# Patient Record
Sex: FEMALE | Race: WHITE | NOT HISPANIC OR LATINO | Employment: OTHER | URBAN - METROPOLITAN AREA
[De-identification: names, ages, dates, MRNs, and addresses within clinical notes are randomized per-mention and may not be internally consistent; named-entity substitution may affect disease eponyms.]

---

## 2023-09-24 PROBLEM — Z79.899 ENCOUNTER FOR LONG-TERM CURRENT USE OF HIGH RISK MEDICATION: Status: ACTIVE | Noted: 2023-09-24

## 2023-09-24 PROBLEM — F41.1 GAD (GENERALIZED ANXIETY DISORDER): Status: ACTIVE | Noted: 2023-09-24

## 2023-09-24 PROBLEM — F31.10 BIPOLAR DISORDER, CURRENT EPISODE MANIC WITHOUT PSYCHOTIC FEATURES (MULTI): Status: ACTIVE | Noted: 2023-09-24

## 2023-09-24 PROBLEM — G47.9 SLEEP DISTURBANCES: Status: ACTIVE | Noted: 2023-09-24

## 2023-09-24 PROBLEM — F70 MILD INTELLECTUAL DISABILITY: Status: ACTIVE | Noted: 2023-09-24

## 2023-09-24 PROBLEM — F84.9: Status: ACTIVE | Noted: 2023-09-24

## 2023-09-24 RX ORDER — LOSARTAN POTASSIUM 50 MG/1
TABLET ORAL
COMMUNITY

## 2023-09-24 RX ORDER — SPIRONOLACTONE 25 MG/1
TABLET ORAL
COMMUNITY

## 2023-09-24 RX ORDER — EPINEPHRINE 0.3 MG/.3ML
INJECTION INTRAMUSCULAR
COMMUNITY

## 2023-09-24 RX ORDER — DROSPIRENONE AND ETHINYL ESTRADIOL 0.02-3(28)
KIT ORAL
COMMUNITY

## 2023-09-24 RX ORDER — ARIPIPRAZOLE 5 MG/1
TABLET ORAL
COMMUNITY

## 2023-09-24 RX ORDER — LITHIUM CARBONATE 150 MG/1
150 CAPSULE ORAL NIGHTLY
COMMUNITY

## 2023-09-24 RX ORDER — PROGESTERONE 100 MG/1
CAPSULE ORAL
COMMUNITY

## 2023-09-24 RX ORDER — LITHIUM CARBONATE 300 MG/1
TABLET, FILM COATED, EXTENDED RELEASE ORAL
COMMUNITY

## 2023-09-24 RX ORDER — ALPRAZOLAM 0.5 MG/1
0.5 TABLET ORAL DAILY PRN
COMMUNITY

## 2023-09-24 RX ORDER — TALC
3 POWDER (GRAM) TOPICAL NIGHTLY
COMMUNITY

## 2023-10-12 PROBLEM — F70 MILD INTELLECTUAL DISABILITY: Status: ACTIVE | Noted: 2023-10-12

## 2023-10-12 PROBLEM — F31.10 BIPOLAR DISORDER, CURRENT EPISODE MANIC WITHOUT PSYCHOTIC FEATURES (MULTI): Status: ACTIVE | Noted: 2023-10-12

## 2023-10-12 PROBLEM — F41.1 GAD (GENERALIZED ANXIETY DISORDER): Status: ACTIVE | Noted: 2023-10-12

## 2023-10-12 PROBLEM — F84.9: Status: ACTIVE | Noted: 2023-10-12

## 2023-10-12 PROBLEM — G47.9 SLEEP DISTURBANCES: Status: ACTIVE | Noted: 2023-10-12

## 2023-10-12 RX ORDER — LITHIUM CARBONATE 150 MG/1
1 CAPSULE ORAL NIGHTLY
COMMUNITY
Start: 2018-01-08 | End: 2023-10-24 | Stop reason: SDUPTHER

## 2023-10-12 RX ORDER — ALPRAZOLAM 0.5 MG/1
1 TABLET ORAL DAILY PRN
COMMUNITY
Start: 2017-10-13 | End: 2024-02-23 | Stop reason: SDUPTHER

## 2023-10-12 RX ORDER — LITHIUM CARBONATE 300 MG/1
2 TABLET, FILM COATED, EXTENDED RELEASE ORAL
COMMUNITY
Start: 2018-04-12 | End: 2023-10-24 | Stop reason: SDUPTHER

## 2023-10-12 RX ORDER — LOSARTAN POTASSIUM 50 MG/1
50 TABLET ORAL
COMMUNITY
Start: 2017-06-02

## 2023-10-12 RX ORDER — EPINEPHRINE 0.3 MG/.3ML
INJECTION INTRAMUSCULAR
COMMUNITY
Start: 2017-08-17

## 2023-10-12 RX ORDER — TALC
1 POWDER (GRAM) TOPICAL NIGHTLY
COMMUNITY
Start: 2019-01-14

## 2023-10-12 RX ORDER — PROGESTERONE 100 MG/1
100 CAPSULE ORAL
COMMUNITY
Start: 2020-10-09

## 2023-10-12 RX ORDER — SPIRONOLACTONE 25 MG/1
25 TABLET ORAL
COMMUNITY
Start: 2021-09-20

## 2023-10-12 RX ORDER — DROSPIRENONE AND ETHINYL ESTRADIOL 0.02-3(28)
KIT ORAL
COMMUNITY
Start: 2017-07-06

## 2023-10-13 ENCOUNTER — TELEMEDICINE (OUTPATIENT)
Dept: BEHAVIORAL HEALTH | Facility: CLINIC | Age: 38
End: 2023-10-13
Payer: MEDICARE

## 2023-10-13 DIAGNOSIS — F31.10 BIPOLAR DISORDER, CURRENT EPISODE MANIC WITHOUT PSYCHOTIC FEATURES (MULTI): ICD-10-CM

## 2023-10-13 DIAGNOSIS — F41.1 GAD (GENERALIZED ANXIETY DISORDER): ICD-10-CM

## 2023-10-13 DIAGNOSIS — F70 MILD INTELLECTUAL DISABILITY: ICD-10-CM

## 2023-10-13 PROCEDURE — 90834 PSYTX W PT 45 MINUTES: CPT | Performed by: COUNSELOR

## 2023-10-13 NOTE — PROGRESS NOTES
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 She will yell and scream that she is going to get sick, fall into the lake, etc; when we tried to challenge what was bothering her about [place], she notes it would be busy, when asked how she would know that, she said it was bc of COVID, when asked if she was worried about getting COVID, she said no  - she notes she is excited for the weekend and will see her sister and go to the [facility] and she is going out with her provider (they went to the zoo and her provider stayed overnight with just her at her house)  - mom notes she is really worried about the carpet installation they are about to have; they “taped off” the area and she ripped it all down    focused on:  - take a photo of her room before they break it down, have her help box things up and then unpack them when its done  - coping skills; distraction techniques  - we talked about the response should match the problem, small problem, small response  - her \"gripe list\" and talking out her emotions with her parents    "

## 2023-10-24 ENCOUNTER — TELEMEDICINE (OUTPATIENT)
Dept: BEHAVIORAL HEALTH | Facility: CLINIC | Age: 38
End: 2023-10-24
Payer: MEDICARE

## 2023-10-24 VITALS
SYSTOLIC BLOOD PRESSURE: 130 MMHG | HEART RATE: 83 BPM | BODY MASS INDEX: 37.03 KG/M2 | HEIGHT: 63 IN | DIASTOLIC BLOOD PRESSURE: 78 MMHG | WEIGHT: 209 LBS

## 2023-10-24 DIAGNOSIS — F31.10 BIPOLAR DISORDER, CURRENT EPISODE MANIC WITHOUT PSYCHOTIC FEATURES (MULTI): ICD-10-CM

## 2023-10-24 DIAGNOSIS — F70 MILD INTELLECTUAL DISABILITY: ICD-10-CM

## 2023-10-24 DIAGNOSIS — F84.0 AUTISM (HHS-HCC): ICD-10-CM

## 2023-10-24 DIAGNOSIS — G47.9 SLEEP DISTURBANCES: ICD-10-CM

## 2023-10-24 DIAGNOSIS — F41.1 GAD (GENERALIZED ANXIETY DISORDER): ICD-10-CM

## 2023-10-24 PROCEDURE — 99214 OFFICE O/P EST MOD 30 MIN: CPT | Performed by: NURSE PRACTITIONER

## 2023-10-24 RX ORDER — LITHIUM CARBONATE 300 MG/1
TABLET, FILM COATED, EXTENDED RELEASE ORAL
Qty: 360 TABLET | Refills: 1 | Status: SHIPPED | OUTPATIENT
Start: 2023-10-24 | End: 2024-04-18

## 2023-10-24 RX ORDER — LITHIUM CARBONATE 150 MG/1
150 CAPSULE ORAL NIGHTLY
Qty: 90 CAPSULE | Refills: 1 | Status: SHIPPED | OUTPATIENT
Start: 2023-10-24 | End: 2024-04-18

## 2023-10-24 RX ORDER — ARIPIPRAZOLE 5 MG/1
TABLET ORAL
Qty: 90 TABLET | Refills: 1 | Status: SHIPPED | OUTPATIENT
Start: 2023-10-24 | End: 2024-04-18

## 2023-10-24 NOTE — PROGRESS NOTES
ASSESSMENT: Ms. Pinto presents at best baseline mental health wise.   See treatment plan below.     Pharmacogenomics Testing (PGT) results reviewed:   Use as Directed:    Alprazolam (Xanax), Aripiprazole (Abilify)  No Proven Genetic Markers:   Lithium     PLAN:                                                                                                                       problems treated   f/u requested to prevent relapse   medications renewed/re-ordered     1. Continue alprazolam (Xanax) 0.50 mg by mouth daily PRN to be taken prior to leaving for any new event or activity. I have personally reviewed the OARRS report 10/24/23. I have considered the risks of abuse, dependence, addiction and diversion. Last filled 10/12/2022 for a quantity of 30 tablets.  No urine benzodiazepine confirmation screen given due to client takes this so rarely that urine drug screen will be negative.  2. Continue Lithium 600 mg by mouth in the morning, 300 mg at 3 pm, 450 mg at bedtime for moods  3. Continue Aripiprazole (Abilify) take a half a tab (2.5 mg) by mouth daily, may take additional half TAB (2.5 mg) by mouth daily PRN for moods. Mom will contact when refill is needed.  4. Risks, benefits, alternatives, off-label uses, and side effects of medications have been discussed with patient/caregiver. There is no report of signs/symptoms consistent with medication-induced impairment in daily functioning. At this time, benefits of medication felt to outweigh potential risks. Will continue to reassess need for psychotropic medication at regular 3 to 6 month intervals.  5. Return To Clinic in 6 months or earlier if needed. Call [phone] to reschedule.     Thank you for seeing me today.  If you have any questions or concerns, do not hesitate to contact my office.  Millie Ugarte     TREATMENT TYPE                                                                                                  counseling and coordination of  care addressing signs and symptoms of illness; risks/benefits and side effects of medications; and behavioral approaches to illness.  This note was created using electronic dictation. There may be errors in syntax and meaning. Please contact the office with any questions.   For [facility] residents, [employer] is a 24/7 hotline you can call for assistance [[phone]].   Please call 911 or go to your closest Emergency Room if you feel worse. This includes thoughts of hurting yourself or anyone else, or having other troubles such as hearing voices, seeing visions, or having new and scary thoughts about the people around you.      Provider Impressions     PRESENT FOR APPOINTMENT  Client  Mother Bessy Pinto  Father: Mohamud Pinto     SUBJECTIVE: 39 yo CF with a history of Autism, MATTHEW, ID and Bipolar DO presenting for medication management.      Last seen May 2023.  At that time, no medication changes.  November 2022. At that time, no medication changes.  October 2022. At that time, alprazolam PRN was increased.   April 2022. At that time, no medication changes.  January 2022. At that time, no medication changes.  October 2021. At that time, no medication changes.   July 2021. At that time, alprazolam PRN was decreased.   April 2021.At that time, no medication changes.   January 2021.At that time, no medication changes.   October 2020. At that time, no medication changes.   July 2020. At that time, Abilify was decreased and PRN Alprazolam was increased.  November 2019. At that time, no med changes.  August 2019. At that time, no med changes.  April 2019. At that time, no med changes.  January 2019. At that time, Melatonin was started.   August 2018. At that time, no med changes.  July 2018. At that time, recommended moving majority of dose Tripp to HS.   April 2018. At that time, no medicine changes.  February 2018: Alprazolam was discontinued due to complaints of dizziness  Jan 2018: BuSpar was  "discontinued and lithium was increased.  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 At that time, BuSpar was started.     Ava reports that she went to [place] & watched the buffalo run.  Lists activities: watches [employer], walks, dinner w/friends, talks on phone, raking leaves, going to dress as a witch for [employer].   Sleeping good at night.  Sees monthly [facility] for therapy.  October 2022 DNC. discovered rectal prolapse. Saw colorectal sx in [facility] & colorectal hysterectomy- decided no Sx but IUD. No longer bleeding and mood has improved.  May have need sx for prolapsed rectum.  Sees Candis, through provider services, every Monday. Goes for walks.       PRN alprazolam and PRN Abilify:  Vacation at [facility]- needed PRN most every day. Did not want to shower- screaming and parents afraid others will call police.  Vielka SCHULTZ, in parking garage- refused to get out to eat at restaurant- screaming. Left without eating.  Still needs PRN when making plans with friends.  \"her anxiety is holding us hostage. we have had to change plans.\"  small ramps cause screaming.  Even when plans are made in advance, they will drive there ([place]) once there, she will start screaming.  \"Panic in her eyes\".   \"Off the chart\" panic - vacation= 3 x in a week.   - any time in a new environment. Parents go ahead of time and will video the route to walk in and bring it back to her. she mostly refuses to walk to the event.  - mostly stays in her room, cries, stays in her room, states she is sick. She may change her mind and then go.      She may cry, but uses deep breathing and able to calm herself. Plans are not made too far in advanced. Ava will obsess over it until the time comes. When her parents are leaving, she often holds then up by changing her mind about whether she is going or not.     eats dinner in her own apartment- which is in her parents home  grocery [employer]     Has a 6 yo Border Collie named Juan.  Sleep: Denies issues " falling and staying asleep. States that the Melatonin is working.      Utilizing charts and check off list for rules and behavior. Runs the vacuum, sets the table, laundry     HX: screaming, Somatic: With complaints of headache and stomachache, slamming the doors, going to her room and refusing to come out, reported aggression (hitting/pushing?) lasting 30 min, until parents must leave in order to get to work on time.      Ava identifies more with elderly than with peers.   MEDICATIONS: Ritalin- blinking tics developed.  Anafranil = made worse, Pamelor, then Lindon since age 7. Dx manic/depressive.   Senior year of HS- Imipramine- caused HS bed wetting= then Abilify  BuSpar= increased anxiety     Psy/SI/HI/aggression. Denies A/V/TH. Ava states that she is only sad when a friend dies.  Dx PDD and Bipolar age 7 yo   Med Physicians:  PCP: Dr. Webb/ [facility]. July 2018.   Dr. Webb office # [phone] = for lab & EKG results  Urologist: Feb 2018. May need cystogram. If so, will be performed in OR.  GYN:Dr. Culp. September 30, 2022 DNC- possible hysterectomy. F/U 11/1/22  ALLERGIES: NKDA     Med SE wt estella since middle school  2003/2004 hyperthyroid lost 30#  2007 gall bladder removed     COMPLIANCE: good     EPS- lower leg shuffling (not noted as via zoom) and pill rolling (1) oral buccal movements with tongue jutting (1) mouth opening (1) noted. Ct states does not feel uncomfortable in her skin. Severity (1). Ct does not appear aware. With decrease in Abilify, she has had a decrease in hand tremor.  AIMS score 4 based on the above. 5/9/23     LABS REVIEWED:  August 2022 in chart  May 2021: in chart under May 7 2021 [facility] Patient Information  Lithium (0.8)  August 2020: Lithium (0.8), lipid, CBC, CMP- reviewed.  July 2019: Lipid, BMP, & CBC reviewed.  Feb 2019: Lithium 0.9  others reviewed     Feb 2018:  TSH- WNL  Lithium-1.0- WNL  Vit D-40.6- WNL     EKG   May 2021: in chart under May 7  2021 [facility] Patient Information  66 bpm  QTc 396     August 2019:  65 bpm  QTc 416 ms   Mental Status Exam     Appearance: well-groomed.   Build: overweight.   Demeanor: average.   Eye Contact: average.   Motor Activity: average.   Speech: clear.   Language: Neurologic language is intact.   Fund of Knowledge: fair fund of knowledge.   Delusions: None Reported.   Self Harm: None Reported.   Aggressive: None Reported.   Mood: euthymic.   Affect: full.   Orientation: alert.   Manner: cooperative.   Thought process: concrete.   Thought association: Impairment in rational thinking.   Content of thought: As noted in HPI   Abstract/ Rational Thought: minimal impairment   Memory: grossly intact.   Behavior: calm.   Intelligence Estimate: intellectual disability.   Insight: minimal impairment.   Judgement: minimal impairment.   Musculoskeletal: normal strength and tone.      Scores and Scales        Facial and Oral Movements:   Muscles of Facial Expression eg. movements of forehead, eyebrows, periorbital area, cheeks; include frowning, blinking, smiling, grimacing: None normal (0).   Lips and Perioral Area eg. puckering, pouting, smacking: Minimal (may be extreme normal) (1).   Jaw eg. biting, clenching, chewing, mouth opening, lateral movement: None normal (0).   Tongue. Rate only increases in movement both in and out of mouth, NOT inability to sustain movement Minimal (may be extreme normal) (1).   Extremity Movements:   Upper (arms, wrists, hands, fingers). Include chronic movements ( ie, rapid, objectively purposeless, irregular, spontaneous); athetoid movements (ie, slow, irregular, complex, serpentine). DO NOT include tremor (ie, repetitive, regular, rythmic): Minimal (may be extreme normal) (1).   Lower (legs, knees, ankles, toes) eg, lateral knee movement, foot tapping, heel dropping, foot squirming, inversion and eversion of foot: None normal (0).   Trunk Movements:   Neck, shoulders, hips. eg, rocking, twisting,  squirming, pelvic gyrations: None normal (0).      Overall Severity:   Severity of abnormal movements: Minimal (may be extreme normal) (1).   Incapacitation due to abnormal movements: None normal (0).   Patient's awareness of abnormal movements (rate only patient's report): No awareness (0).   Dental Status:   Current porblems with teeth and/or dentures: No.   Does patient usually wear dentures: No.

## 2023-11-17 ENCOUNTER — TELEMEDICINE (OUTPATIENT)
Dept: BEHAVIORAL HEALTH | Facility: CLINIC | Age: 38
End: 2023-11-17
Payer: MEDICARE

## 2023-11-17 DIAGNOSIS — F41.1 GAD (GENERALIZED ANXIETY DISORDER): ICD-10-CM

## 2023-11-17 DIAGNOSIS — F31.10 BIPOLAR DISORDER, CURRENT EPISODE MANIC WITHOUT PSYCHOTIC FEATURES (MULTI): ICD-10-CM

## 2023-11-17 DIAGNOSIS — F70 MILD INTELLECTUAL DISABILITY: ICD-10-CM

## 2023-11-17 PROCEDURE — 90832 PSYTX W PT 30 MINUTES: CPT | Performed by: COUNSELOR

## 2023-11-17 NOTE — PROGRESS NOTES
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)  Visit Type: via epic  Reason for visit: managing her worry and mood    - notes she went to her friends bday party (she turned 79), she notes went to the dentist and did good  - notes she went trick or treating and had fun  - she notes she had dinner with her sister and “stayed calm” and they had the carpet replaced and she did good, she helped move things  - she notes she has been using her walking stick and really likes it  - kept noting she has been good and staying calm, looking forward to [place], making her list, seeing family, eating, seeing Alyssa with her provider  - parents note overall its been really good, but they never assume it will stay that way; they did note she recently got a medical diagnosis that can be made worse by anxiety and hearing that from the doc did make an impact    focused on:  - activity books to stay calm  - coping skills; distraction techniques  - we talked about the response should match the problem, small problem, small response  - her \"gripe list\" and talking out her emotions with her parents      "

## 2023-12-15 ENCOUNTER — TELEMEDICINE (OUTPATIENT)
Dept: BEHAVIORAL HEALTH | Facility: CLINIC | Age: 38
End: 2023-12-15
Payer: MEDICARE

## 2023-12-15 DIAGNOSIS — F70 MILD INTELLECTUAL DISABILITY: ICD-10-CM

## 2023-12-15 DIAGNOSIS — F41.1 GAD (GENERALIZED ANXIETY DISORDER): ICD-10-CM

## 2023-12-15 DIAGNOSIS — F31.10 BIPOLAR DISORDER, CURRENT EPISODE MANIC WITHOUT PSYCHOTIC FEATURES (MULTI): ICD-10-CM

## 2023-12-15 PROCEDURE — 90834 PSYTX W PT 45 MINUTES: CPT | Performed by: COUNSELOR

## 2023-12-15 NOTE — PROGRESS NOTES
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 She notes she will just “not worry next time”  - she notes she has been doing really well using her walking stick and getting outside [place] bc the weather has been better, no snow    focused on:  - coping skills; distraction techniques and using her activity/sticker books, walking stick  - reviewed response should match the problem, small problem, small response  - her \"gripe list\" and talking out her emotions with her parents ([place] about things like her hair)    "

## 2024-01-19 ENCOUNTER — TELEMEDICINE (OUTPATIENT)
Dept: BEHAVIORAL HEALTH | Facility: CLINIC | Age: 39
End: 2024-01-19
Payer: MEDICARE

## 2024-01-19 DIAGNOSIS — F31.10 BIPOLAR DISORDER, CURRENT EPISODE MANIC WITHOUT PSYCHOTIC FEATURES (MULTI): ICD-10-CM

## 2024-01-19 DIAGNOSIS — F41.1 GAD (GENERALIZED ANXIETY DISORDER): ICD-10-CM

## 2024-01-19 DIAGNOSIS — F70 MILD INTELLECTUAL DISABILITY: ICD-10-CM

## 2024-01-19 PROCEDURE — 90834 PSYTX W PT 45 MINUTES: CPT | Performed by: COUNSELOR

## 2024-01-19 NOTE — PROGRESS NOTES
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)  Visit Type: epic  Reason for visit: managing her mood and worry    - she notes she went to the movies and it was too much and they left; she notes it was too crowded  - she notes she went to a foot doc apt and started to have a hard time and had to take a Xanax; when asked why she thinks shes having a hard time she said she is not sure, she said she wants to see people and talk, but she doesn't like “going out anymore”  - notes she has been doing more puzzles and got a light brite for [place]ae   - when asked what she has been doing to cope she stated deep breaths, it was noted again that deep breaths don't seem to be working, she noted she talked about it and told mom she didn't want to go to the movies but they went anyway. We spent time trying to figure out what and how she was feeling when at the movies, she mentioned there were parts of the movie that were scary and she felt “jumpy”    focused on:  - make plan before going somewhere, try to work through it while there, bring something with her, like a fidget or stress ball, activity book  - coping skills; distraction techniques, walking stick  - reviewed response should match the problem, small problem, small response and continue with her \"gripe list\" and talking out her emotions with her parents      "

## 2024-02-23 ENCOUNTER — OFFICE VISIT (OUTPATIENT)
Dept: BEHAVIORAL HEALTH | Facility: CLINIC | Age: 39
End: 2024-02-23
Payer: COMMERCIAL

## 2024-02-23 VITALS
HEART RATE: 88 BPM | WEIGHT: 218 LBS | DIASTOLIC BLOOD PRESSURE: 76 MMHG | BODY MASS INDEX: 38.62 KG/M2 | TEMPERATURE: 98 F | SYSTOLIC BLOOD PRESSURE: 125 MMHG | RESPIRATION RATE: 18 BRPM

## 2024-02-23 DIAGNOSIS — Z79.899 ENCOUNTER FOR LONG-TERM (CURRENT) USE OF HIGH-RISK MEDICATION: ICD-10-CM

## 2024-02-23 DIAGNOSIS — F41.1 GAD (GENERALIZED ANXIETY DISORDER): ICD-10-CM

## 2024-02-23 PROCEDURE — 1036F TOBACCO NON-USER: CPT | Performed by: NURSE PRACTITIONER

## 2024-02-23 PROCEDURE — 99215 OFFICE O/P EST HI 40 MIN: CPT | Performed by: NURSE PRACTITIONER

## 2024-02-23 RX ORDER — ALPRAZOLAM 0.5 MG/1
0.5 TABLET ORAL 2 TIMES DAILY PRN
Qty: 60 TABLET | Refills: 2 | Status: SHIPPED | OUTPATIENT
Start: 2024-02-23 | End: 2024-04-24 | Stop reason: SDUPTHER

## 2024-02-23 ASSESSMENT — PAIN SCALES - GENERAL: PAINLEVEL: 0-NO PAIN

## 2024-02-23 NOTE — PROGRESS NOTES
ASSESSMENT: Ms. Pinto presents with increased anxiety, anticipatory anxiety, panic and agoraphobia.    See treatment plan below.     Pharmacogenomics Testing (PGT) results reviewed:   Use as Directed:    Alprazolam (Xanax), Aripiprazole (Abilify)  No Proven Genetic Markers:   Lithium     PLAN:                                                                                                                       problems treated   f/u requested to prevent relapse   medications renewed/re-ordered     1. Increase alprazolam (Xanax) 0.50 mg by mouth BID. I have personally reviewed the OARRS report 2/23/24. I have considered the risks of abuse, dependence, addiction and diversion. No urine benzodiazepine confirmation screen given due to client takes this so rarely that urine drug screen will be negative.  Benzo agreement signed 2/23/24  2. Continue Lithium 600 mg by mouth in the morning, 300 mg at 3 pm, 450 mg at bedtime for moods  3. Continue Aripiprazole (Abilify) take a half a tab (2.5 mg) by mouth daily, may take additional half TAB (2.5 mg) by mouth daily PRN for moods. Mom will contact when refill is needed.  4. Risks, benefits, alternatives, off-label uses, and side effects of medications have been discussed with patient/caregiver. There is no report of signs/symptoms consistent with medication-induced impairment in daily functioning. At this time, benefits of medication felt to outweigh potential risks. Will continue to reassess need for psychotropic medication at regular 3 to 6 month intervals.  5. Return To Clinic in 1-3 months or earlier if needed. Call [phone] to reschedule.  6. Rx for fasting labs and lithium level given      Thank you for seeing me today.  If you have any questions or concerns, do not hesitate to contact my office.  Millie Ugarte     TREATMENT TYPE                                                                                                  counseling and coordination of care  addressing signs and symptoms of illness; risks/benefits and side effects of medications; and behavioral approaches to illness.  This note was created using electronic dictation. There may be errors in syntax and meaning. Please contact the office with any questions.   For [facility] residents, [employer] is a 24/7 hotline you can call for assistance [[phone]].   Please call 911 or go to your closest Emergency Room if you feel worse. This includes thoughts of hurting yourself or anyone else, or having other troubles such as hearing voices, seeing visions, or having new and scary thoughts about the people around you.      Provider Impressions     PRESENT FOR APPOINTMENT  Client  Mother Bessy Pinto  Father: Mohamud Pinto  face-to-face appointment  seen for an earlier appointment due to decompensation.  SUBJECTIVE: 37 yo CF with a history of Autism, MATTHEW, ID and Bipolar DO presenting for medication management.      Last seen   October 2023. At that time, no medication changes.  May 2023.  At that time, no medication changes.  November 2022. At that time, no medication changes.  October 2022. At that time, alprazolam PRN was increased.   April 2022. At that time, no medication changes.  January 2022. At that time, no medication changes.  October 2021. At that time, no medication changes.   July 2021. At that time, alprazolam PRN was decreased.   April 2021.At that time, no medication changes.   January 2021.At that time, no medication changes.   October 2020. At that time, no medication changes.   July 2020. At that time, Abilify was decreased and PRN Alprazolam was increased.  November 2019. At that time, no med changes.  August 2019. At that time, no med changes.  April 2019. At that time, no med changes.  January 2019. At that time, Melatonin was started.   August 2018. At that time, no med changes.  July 2018. At that time, recommended moving majority of dose [place] to HS.   April 2018. At that  time, no medicine changes.  February 2018: Alprazolam was discontinued due to complaints of dizziness  Jan 2018: BuSpar was discontinued and lithium was increased.  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 At that time, BuSpar was started.     From: [email or web address] <[email or web address]>   Sent: Thursday, February 22, 2024 9:38 AM  To: Millie Costa <[email or web address]>  Cc: Ale Ashby <[email or web address]>  Subject: Ava Pinto [birth year]  Hi Millie,  We are in need of immediate assistance.    Despite our monthly sessions with Ale and the use of Xanax before outings, Ava's anxiety episodes have escalated.    Her screaming, arm waving tantrums are to the point of us fearing that she will have a stroke.     She has started to get upset with us if we make plans and doesn't want to go with us. During these episodes she has tried to physically prevent us from leaving.  We have had to cancel plans because of this and she  is causing our dog to become agitated and act negatively toward her.    We have a zoom meeting scheduled for April 24 @ 1:15. We would like to schedule a meeting ASAP to discuss the best course of action.  Please let us know if this possible.    Thank you,     Juan Pinto  [phone] home  [phone] cell    Ava reports that she went to [facility] & watched the buffalo run.  Lists activities: watches [employer], walks, dinner w/friends, talks on phone, raking leaves, going to dress as a witch for [employer].   Sleeping good at night.  Sees monthly Ale for therapy.  October 2022 DNC. discovered rectal prolapse. Saw colorectal sx in [facility] & colorectal hysterectomy- decided no Sx but IUD. No longer bleeding and mood has improved.  May have need sx for prolapsed rectum.  Sees Candis, through provider services, every Monday. Goes for walks.       PRN alprazolam and PRN Abilify:  Vacation at [facility]- needed PRN most every day. Did not  "want to shower- screaming and parents afraid others will call police.  Vielka SCHULTZ, in parking garage- refused to get out to eat at restaurant- screaming. Left without eating.  Still needs PRN when making plans with friends.  \"her anxiety is holding us hostage. we have had to change plans.\"  small ramps cause screaming.  Even when plans are made in advance, they will drive there ([place]) once there, she will start screaming.  \"Panic in her eyes\".   \"Off the chart\" panic - vacation= 3 x in a week.   - any time in a new environment. Parents go ahead of time and will video the route to walk in and bring it back to her. she mostly refuses to walk to the event.  - mostly stays in her room, cries, stays in her room, states she is sick. She may change her mind and then go.      She may cry, but uses deep breathing and able to calm herself. Plans are not made too far in advanced. Ava will obsess over it until the time comes. When her parents are leaving, she often holds then up by changing her mind about whether she is going or not.     eats dinner in her own apartment- which is in her parents home  grocery shops     Has a 8 yo Border Collie named Juan.  Sleep: Denies issues falling and staying asleep. States that the Melatonin is working.      Utilizing charts and check off list for rules and behavior. Runs the vacuum, sets the table, laundry     HX: screaming, Somatic: With complaints of headache and stomachache, slamming the doors, going to her room and refusing to come out, reported aggression (hitting/pushing?) lasting 30 min, until parents must leave in order to get to work on time.      Ava identifies more with elderly than with peers.   MEDICATIONS: Ritalin- blinking tics developed.  Anafranil = made worse, Pamelor, then [place] since age 7. Dx manic/depressive.   Senior year of HS- Imipramine- caused HS bed wetting= then Abilify  BuSpar= increased anxiety     Psy/SI/HI/aggression. Denies A/V/TH. Ava " states that she is only sad when a friend dies.  Dx PDD and Bipolar age 7 yo   Med Physicians:  PCP: Dr. Webb/ [facility]. July 2018.   Dr. Webb office # [phone] = for lab & EKG results  Urologist: Feb 2018. May need cystogram. If so, will be performed in OR.  GYN:Dr. Culp. September 30, 2022 DNC- possible hysterectomy. F/U 11/1/22  ALLERGIES: NKDA     Med SE wt estella since middle school  2003/2004 hyperthyroid lost 30#  2007 gall bladder removed     COMPLIANCE: good     EPS- lower leg shuffling (not noted as via zoom) and pill rolling (1) oral buccal movements with tongue jutting (1) mouth opening (1) noted. Ct states does not feel uncomfortable in her skin. Severity (1). Ct does not appear aware. With decrease in Abilify, she has had a decrease in hand tremor.  AIMS score 4 based on the above. 5/9/23     LABS REVIEWED:  August 2022 in chart  May 2021: in chart under May 7 2021 [facility]Psych Patient Information  Lithium (0.8)  August 2020: Lithium (0.8), lipid, CBC, CMP- reviewed.  July 2019: Lipid, BMP, & CBC reviewed.  Feb 2019: Lithium 0.9  others reviewed     Feb 2018:  TSH- WNL  Lithium-1.0- WNL  Vit D-40.6- WNL     EKG   May 2021: in chart under May 7 2021 [facility] Patient Information  66 bpm  QTc 396     August 2019:  65 bpm  QTc 416 ms   Mental Status Exam     Appearance: well-groomed.   Build: overweight.   Demeanor: average.   Eye Contact: average.   Motor Activity: average.   Speech: clear.   Language: Neurologic language is intact.   Fund of Knowledge: fair fund of knowledge.   Delusions: None Reported.   Self Harm: None Reported.   Aggressive: None Reported.   Mood: euthymic.   Affect: full.   Orientation: alert.   Manner: cooperative.   Thought process: concrete.   Thought association: Impairment in rational thinking.   Content of thought: As noted in HPI   Abstract/ Rational Thought: minimal impairment   Memory: grossly intact.   Behavior: calm.   Intelligence Estimate: intellectual  disability.   Insight: minimal impairment.   Judgement: minimal impairment.   Musculoskeletal: normal strength and tone.      Scores and Scales        Facial and Oral Movements:   Muscles of Facial Expression eg. movements of forehead, eyebrows, periorbital area, cheeks; include frowning, blinking, smiling, grimacing: None normal (0).   Lips and Perioral Area eg. puckering, pouting, smacking: Minimal (may be extreme normal) (1).   Jaw eg. biting, clenching, chewing, mouth opening, lateral movement: None normal (0).   Tongue. Rate only increases in movement both in and out of mouth, NOT inability to sustain movement Minimal (may be extreme normal) (1).   Extremity Movements:   Upper (arms, wrists, hands, fingers). Include chronic movements ( ie, rapid, objectively purposeless, irregular, spontaneous); athetoid movements (ie, slow, irregular, complex, serpentine). DO NOT include tremor (ie, repetitive, regular, rythmic): Minimal (may be extreme normal) (1).   Lower (legs, knees, ankles, toes) eg, lateral knee movement, foot tapping, heel dropping, foot squirming, inversion and eversion of foot: None normal (0).   Trunk Movements:   Neck, shoulders, hips. eg, rocking, twisting, squirming, pelvic gyrations: None normal (0).      Overall Severity:   Severity of abnormal movements: Minimal (may be extreme normal) (1).   Incapacitation due to abnormal movements: None normal (0).   Patient's awareness of abnormal movements (rate only patient's report): No awareness (0).   Dental Status:   Current porblems with teeth and/or dentures: No.   Does patient usually wear dentures: No.

## 2024-02-23 NOTE — PATIENT INSTRUCTIONS
ASSESSMENT: Ms. Pinto presents with increased anxiety, anticipatory anxiety, panic and agoraphobia.    See treatment plan below.     Pharmacogenomics Testing (PGT) results reviewed:   Use as Directed:    Alprazolam (Xanax), Aripiprazole (Abilify)  No Proven Genetic Markers:   Lithium     PLAN:                                                                                                                       problems treated   f/u requested to prevent relapse   medications renewed/re-ordered     1. Increase alprazolam (Xanax) 0.50 mg by mouth BID. I have personally reviewed the OARRS report 2/23/24. I have considered the risks of abuse, dependence, addiction and diversion. No urine benzodiazepine confirmation screen given due to client takes this so rarely that urine drug screen will be negative.  2. Continue Lithium 600 mg by mouth in the morning, 300 mg at 3 pm, 450 mg at bedtime for moods  3. Continue Aripiprazole (Abilify) take a half a tab (2.5 mg) by mouth daily, may take additional half TAB (2.5 mg) by mouth daily PRN for moods. Mom will contact when refill is needed.  4. Risks, benefits, alternatives, off-label uses, and side effects of medications have been discussed with patient/caregiver. There is no report of signs/symptoms consistent with medication-induced impairment in daily functioning. At this time, benefits of medication felt to outweigh potential risks. Will continue to reassess need for psychotropic medication at regular 3 to 6 month intervals.  5. Return To Clinic in 1-3 months or earlier if needed. Call [phone] to reschedule.  6. Rx for fasting labs and lithium level given      Thank you for seeing me today.  If you have any questions or concerns, do not hesitate to contact my office.  Millie Ugarte     TREATMENT TYPE                                                                                                  counseling and coordination of care addressing signs and symptoms of  illness; risks/benefits and side effects of medications; and behavioral approaches to illness.  This note was created using electronic dictation. There may be errors in syntax and meaning. Please contact the office with any questions.   For [facility] residents, [employer] is a 24/7 hotline you can call for assistance [[phone]].   Please call 911 or go to your closest Emergency Room if you feel worse. This includes thoughts of hurting yourself or anyone else, or having other troubles such as hearing voices, seeing visions, or having new and scary thoughts about the people around you.

## 2024-02-26 ENCOUNTER — LAB (OUTPATIENT)
Dept: LAB | Facility: LAB | Age: 39
End: 2024-02-26
Payer: MEDICARE

## 2024-02-26 DIAGNOSIS — Z79.899 ENCOUNTER FOR LONG-TERM (CURRENT) USE OF HIGH-RISK MEDICATION: ICD-10-CM

## 2024-02-26 LAB
ALBUMIN SERPL BCP-MCNC: 4.5 G/DL (ref 3.4–5)
ALP SERPL-CCNC: 74 U/L (ref 33–110)
ALT SERPL W P-5'-P-CCNC: 21 U/L (ref 7–45)
ANION GAP SERPL CALC-SCNC: 11 MMOL/L (ref 10–20)
AST SERPL W P-5'-P-CCNC: 12 U/L (ref 9–39)
BASOPHILS # BLD AUTO: 0.05 X10*3/UL (ref 0–0.1)
BASOPHILS NFR BLD AUTO: 0.5 %
BILIRUB SERPL-MCNC: 0.6 MG/DL (ref 0–1.2)
BUN SERPL-MCNC: 17 MG/DL (ref 6–23)
CALCIUM SERPL-MCNC: 10.6 MG/DL (ref 8.6–10.6)
CHLORIDE SERPL-SCNC: 110 MMOL/L (ref 98–107)
CHOLEST SERPL-MCNC: 158 MG/DL (ref 0–199)
CHOLESTEROL/HDL RATIO: 4.3
CO2 SERPL-SCNC: 29 MMOL/L (ref 21–32)
CREAT SERPL-MCNC: 1.12 MG/DL (ref 0.5–1.05)
EGFRCR SERPLBLD CKD-EPI 2021: 64 ML/MIN/1.73M*2
EOSINOPHIL # BLD AUTO: 0.31 X10*3/UL (ref 0–0.7)
EOSINOPHIL NFR BLD AUTO: 3.1 %
ERYTHROCYTE [DISTWIDTH] IN BLOOD BY AUTOMATED COUNT: 12.9 % (ref 11.5–14.5)
GLUCOSE SERPL-MCNC: 94 MG/DL (ref 74–99)
HCT VFR BLD AUTO: 48.2 % (ref 36–46)
HDLC SERPL-MCNC: 36.6 MG/DL
HGB BLD-MCNC: 14.8 G/DL (ref 12–16)
IMM GRANULOCYTES # BLD AUTO: 0.04 X10*3/UL (ref 0–0.7)
IMM GRANULOCYTES NFR BLD AUTO: 0.4 % (ref 0–0.9)
LDLC SERPL CALC-MCNC: 68 MG/DL
LITHIUM SERPL-SCNC: 0.9 MMOL/L (ref 0.6–1.2)
LYMPHOCYTES # BLD AUTO: 2.99 X10*3/UL (ref 1.2–4.8)
LYMPHOCYTES NFR BLD AUTO: 30.1 %
MCH RBC QN AUTO: 27.8 PG (ref 26–34)
MCHC RBC AUTO-ENTMCNC: 30.7 G/DL (ref 32–36)
MCV RBC AUTO: 90 FL (ref 80–100)
MONOCYTES # BLD AUTO: 0.43 X10*3/UL (ref 0.1–1)
MONOCYTES NFR BLD AUTO: 4.3 %
NEUTROPHILS # BLD AUTO: 6.11 X10*3/UL (ref 1.2–7.7)
NEUTROPHILS NFR BLD AUTO: 61.6 %
NON HDL CHOLESTEROL: 121 MG/DL (ref 0–149)
NRBC BLD-RTO: 0 /100 WBCS (ref 0–0)
PLATELET # BLD AUTO: 305 X10*3/UL (ref 150–450)
POTASSIUM SERPL-SCNC: 4.3 MMOL/L (ref 3.5–5.3)
PROT SERPL-MCNC: 7.3 G/DL (ref 6.4–8.2)
RBC # BLD AUTO: 5.33 X10*6/UL (ref 4–5.2)
SODIUM SERPL-SCNC: 146 MMOL/L (ref 136–145)
TRIGL SERPL-MCNC: 268 MG/DL (ref 0–149)
VLDL: 54 MG/DL (ref 0–40)
WBC # BLD AUTO: 9.9 X10*3/UL (ref 4.4–11.3)

## 2024-02-26 PROCEDURE — 80178 ASSAY OF LITHIUM: CPT

## 2024-02-26 PROCEDURE — 36415 COLL VENOUS BLD VENIPUNCTURE: CPT

## 2024-02-26 PROCEDURE — 80061 LIPID PANEL: CPT

## 2024-02-26 PROCEDURE — 80053 COMPREHEN METABOLIC PANEL: CPT

## 2024-02-26 PROCEDURE — 85025 COMPLETE CBC W/AUTO DIFF WBC: CPT

## 2024-02-27 ENCOUNTER — TELEPHONE (OUTPATIENT)
Dept: BEHAVIORAL HEALTH | Facility: CLINIC | Age: 39
End: 2024-02-27
Payer: MEDICARE

## 2024-02-27 DIAGNOSIS — F31.10 BIPOLAR DISORDER, CURRENT EPISODE MANIC WITHOUT PSYCHOTIC FEATURES (MULTI): ICD-10-CM

## 2024-02-27 NOTE — PROGRESS NOTES
Called to update on lab results and to check to see how Ava is doing.  [phone] (M) Mohamud & Bessy    More relaxed, calmer  Labs threw her off since out of routine, better in evening.  Still hypertalkative.  Have seen improvements with alprazolam.  Mom reports a little happier and less stressed.    New order:  Increase Abilify 5 mg (from 2.5 mg) by mouth daily  Mom will administer 2nd half today and start whole tab QAM tomorrow 2/28/24.  RTC at scheduled April apt.

## 2024-02-28 ENCOUNTER — TELEMEDICINE (OUTPATIENT)
Dept: BEHAVIORAL HEALTH | Facility: CLINIC | Age: 39
End: 2024-02-28
Payer: COMMERCIAL

## 2024-02-28 DIAGNOSIS — F31.10 BIPOLAR DISORDER, CURRENT EPISODE MANIC WITHOUT PSYCHOTIC FEATURES (MULTI): ICD-10-CM

## 2024-02-28 DIAGNOSIS — F41.1 GAD (GENERALIZED ANXIETY DISORDER): ICD-10-CM

## 2024-02-28 DIAGNOSIS — F70 MILD INTELLECTUAL DISABILITY: ICD-10-CM

## 2024-02-28 PROCEDURE — 90837 PSYTX W PT 60 MINUTES: CPT | Performed by: COUNSELOR

## 2024-02-28 PROCEDURE — 1036F TOBACCO NON-USER: CPT | Performed by: COUNSELOR

## 2024-02-28 NOTE — PROGRESS NOTES
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)  Visit Type: epic (5 min) rest via phone (could not connect via [employer])  Reason for visit: managing her worry     - when asked how she is, she states good (but mom emailed she has been having huge meltdowns, to the point they worry she is going to “stroke out”)  - chain of events: she was upset/anxious about her parents going to [place]- being alone- began running around- upset the dog- dog bit her- now on antibiotics and saw her NP for a change in meds  - when asked how we will manage her worry, as usual, she stated she will just stay calm and she was pushed/challenged that she needs to stop and think about what she can really do, things she is comfortable with (we talked about getting the “inside thoughts outside”) and asking her parents for help, using her white board to say she is worried or upset  - talking about what is happening in her body in her head when she starts to feel that; challenging irrational thoughts  - overall talked about her worries, mood and getting out into the community her provider, upcoming holidays      focused on:  - make plan before going somewhere, try to work through it while there, bring something with her, like a fidget or stress ball, activity book  - coping skills; distraction techniques, walking stick  - reviewed response should match the problem, small problem, small response

## 2024-03-20 ENCOUNTER — TELEMEDICINE CLINICAL SUPPORT (OUTPATIENT)
Dept: BEHAVIORAL HEALTH | Facility: CLINIC | Age: 39
End: 2024-03-20
Payer: MEDICARE

## 2024-03-20 DIAGNOSIS — F70 MILD INTELLECTUAL DISABILITY: ICD-10-CM

## 2024-03-20 DIAGNOSIS — F31.10 BIPOLAR DISORDER, CURRENT EPISODE MANIC WITHOUT PSYCHOTIC FEATURES (MULTI): ICD-10-CM

## 2024-03-20 DIAGNOSIS — F41.1 GAD (GENERALIZED ANXIETY DISORDER): ICD-10-CM

## 2024-03-20 PROCEDURE — 90837 PSYTX W PT 60 MINUTES: CPT | Performed by: COUNSELOR

## 2024-03-20 NOTE — PROGRESS NOTES
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 Parents not things overall were better, went and got her hair cut, excited, then it began to snow and right away she stated she is sick and cried, its these small things that she panics about really not any big issues, running errands. She then joined and noted she went to the mall to see the [place] [employer] but he wasn't there  - when asked what happened she kept saying I don't know, and would avoid the question and began talking over the clinician and would ask questions  - she notes she is taking deep breaths and will calm down and she is happy going out with her friends; she was asked about her hair cut, she initially said I don't know, then she said it was due to the snow, dad shoveled and she went in without issues  - notes she is seeing the mayors wife and is excited for the zoo and the eclipse (we tried to spend time prepping to not worry if the weather is bad; she really just wanted to say stay calm, we talked about being proactive with boots, a fidget)    focused on:  - really struggled to just answer something other than I don't know, she was reminded several times to stop and think about what she was going to say  - continue to make plan before going somewhere, try to work through it while there, bring something with her, like a fidget or stress ball, activity book  - coping skills; distraction techniques, walking stick  - reviewed response should match the problem,  small problem, small response

## 2024-04-05 ENCOUNTER — APPOINTMENT (OUTPATIENT)
Dept: BEHAVIORAL HEALTH | Facility: CLINIC | Age: 39
End: 2024-04-05
Payer: MEDICARE

## 2024-04-18 DIAGNOSIS — F31.10 BIPOLAR DISORDER, CURRENT EPISODE MANIC WITHOUT PSYCHOTIC FEATURES (MULTI): ICD-10-CM

## 2024-04-18 RX ORDER — LITHIUM CARBONATE 300 MG/1
TABLET, FILM COATED, EXTENDED RELEASE ORAL
Qty: 360 TABLET | Refills: 0 | Status: SHIPPED | OUTPATIENT
Start: 2024-04-18 | End: 2024-04-24 | Stop reason: SDUPTHER

## 2024-04-18 RX ORDER — ARIPIPRAZOLE 5 MG/1
TABLET ORAL
Qty: 90 TABLET | Refills: 0 | Status: SHIPPED | OUTPATIENT
Start: 2024-04-18 | End: 2024-04-24 | Stop reason: SDUPTHER

## 2024-04-18 RX ORDER — LITHIUM CARBONATE 150 MG/1
150 CAPSULE ORAL NIGHTLY
Qty: 90 CAPSULE | Refills: 0 | Status: SHIPPED | OUTPATIENT
Start: 2024-04-18 | End: 2024-04-24 | Stop reason: SDUPTHER

## 2024-04-24 ENCOUNTER — TELEMEDICINE (OUTPATIENT)
Dept: BEHAVIORAL HEALTH | Facility: CLINIC | Age: 39
End: 2024-04-24
Payer: MEDICARE

## 2024-04-24 DIAGNOSIS — F31.10 BIPOLAR DISORDER, CURRENT EPISODE MANIC WITHOUT PSYCHOTIC FEATURES (MULTI): Primary | ICD-10-CM

## 2024-04-24 DIAGNOSIS — G47.9 SLEEP DISTURBANCES: ICD-10-CM

## 2024-04-24 DIAGNOSIS — F84.0 AUTISM (HHS-HCC): ICD-10-CM

## 2024-04-24 DIAGNOSIS — F70 MILD INTELLECTUAL DISABILITY: ICD-10-CM

## 2024-04-24 DIAGNOSIS — F41.1 GAD (GENERALIZED ANXIETY DISORDER): ICD-10-CM

## 2024-04-24 PROCEDURE — 1036F TOBACCO NON-USER: CPT | Performed by: NURSE PRACTITIONER

## 2024-04-24 PROCEDURE — 99214 OFFICE O/P EST MOD 30 MIN: CPT | Performed by: NURSE PRACTITIONER

## 2024-04-24 RX ORDER — ARIPIPRAZOLE 5 MG/1
TABLET ORAL
Qty: 135 TABLET | Refills: 1 | Status: SHIPPED | OUTPATIENT
Start: 2024-04-24

## 2024-04-24 RX ORDER — LITHIUM CARBONATE 150 MG/1
150 CAPSULE ORAL NIGHTLY
Qty: 90 CAPSULE | Refills: 1 | Status: SHIPPED | OUTPATIENT
Start: 2024-04-24 | End: 2024-10-21

## 2024-04-24 RX ORDER — ALPRAZOLAM 0.5 MG/1
0.5 TABLET ORAL 3 TIMES DAILY
Qty: 90 TABLET | Refills: 0 | Status: SHIPPED | OUTPATIENT
Start: 2024-04-24 | End: 2024-05-29 | Stop reason: SDUPTHER

## 2024-04-24 RX ORDER — LITHIUM CARBONATE 300 MG/1
TABLET, FILM COATED, EXTENDED RELEASE ORAL
Qty: 360 TABLET | Refills: 1 | Status: SHIPPED | OUTPATIENT
Start: 2024-04-24

## 2024-04-24 NOTE — PROGRESS NOTES
ASSESSMENT: Ms. Pinto presents with improvements noted with the increase in Abilify and Xanax.  However, anxiety is still noted.  See treatment plan below.     Pharmacogenomics Testing (PGT) results reviewed:   Use as Directed:    Alprazolam (Xanax), Aripiprazole (Abilify)  No Proven Genetic Markers:   Lithium     PLAN:                                                                                                                  problems treated   f/u requested to prevent relapse   medications renewed/re-ordered     1. Increase alprazolam (Xanax) 0.50 mg by mouth TID (from BID). I have personally reviewed the OARRS report 4/24/24. I have considered the risks of abuse, dependence, addiction and diversion. Benzo agreement signed 2/23/24  2. Continue Lithium 600 mg by mouth in the morning, 300 mg at 3 pm, 450 mg at bedtime for moods  3. Continue Aripiprazole (Abilify) take 5 mg by mouth daily, may take additional half TAB (2.5 mg) by mouth daily PRN for moods. Mom will contact when refill is needed.  4. Risks, benefits, alternatives, off-label uses, and side effects of medications have been discussed with patient/caregiver. There is no report of signs/symptoms consistent with medication-induced impairment in daily functioning. At this time, benefits of medication felt to outweigh potential risks. Will continue to reassess need for psychotropic medication at regular 3 to 6 month intervals.  5. Return To Clinic in 2 months or earlier if needed. Call [phone] to reschedule.     Thank you for seeing me today.  If you have any questions or concerns, do not hesitate to contact my office.  Millie Ugarte     TREATMENT TYPE                                                                                                  counseling and coordination of care addressing signs and symptoms of illness; risks/benefits and side effects of medications; and behavioral approaches to illness.  This note was created using  electronic dictation. There may be errors in syntax and meaning. Please contact the office with any questions.   For [facility] residents, Mobile Crisis is a 24/7 hotline you can call for assistance [[phone]].   Please call 911 or go to your closest Emergency Room if you feel worse. This includes thoughts of hurting yourself or anyone else, or having other troubles such as hearing voices, seeing visions, or having new and scary thoughts about the people around you.      Provider Impressions     PRESENT FOR APPOINTMENT  Client  Mother Bessy Pinto  Father: Mohamud Pinto  An interactive audio and video telecommunication system which permits real time communications between the patient (at the originating site) and provider (at the distant site) was utilized to provide this telehealth service.    Verbal consent was requested and obtained from Ct on this date, for a telehealth visit.  SUBJECTIVE: 37 yo CF with a history of Autism, MATTHEW, ID and Bipolar DO presenting for medication management.      Last seen in person February 2024.  At that time, alprazolam was increased.  In interim, 3 days later, Abilify was increased.  October 2023. At that time, no medication changes.  May 2023.  At that time, no medication changes.  November 2022. At that time, no medication changes.  October 2022. At that time, alprazolam PRN was increased.   April 2022. At that time, no medication changes.  January 2022. At that time, no medication changes.  October 2021. At that time, no medication changes.   July 2021. At that time, alprazolam PRN was decreased.   April 2021.At that time, no medication changes.   January 2021.At that time, no medication changes.   October 2020. At that time, no medication changes.   July 2020. At that time, Abilify was decreased and PRN Alprazolam was increased.  November 2019. At that time, no med changes.  August 2019. At that time, no med changes.  April 2019. At that time, no med  "changes.  January 2019. At that time, Melatonin was started.   August 2018. At that time, no med changes.  July 2018. At that time, recommended moving majority of dose [place] to HS.   April 2018. At that time, no medicine changes.  February 2018: Alprazolam was discontinued due to complaints of dizziness  Jan 2018: BuSpar was discontinued and lithium was increased.  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 At that time, BuSpar was started.    Ava reports that she went to [facility] & her fave animal is elephant.   In May 2024, she will go to [place].  Sleeping good at night.  Mom and Dad report with med increases, calmer, tolerates parents leaving, goes in yard to help Dad with tasks, goes on walks with Dad.  Still worries about weather and stairs. Previously increased anxiety, anticipatory anxiety, panic and agoraphobia.      Sees monthly [facility] for therapy.  October 2022 DNC. discovered rectal prolapse. Saw colorectal sx in [facility] & colorectal hysterectomy- decided no Sx but IUD. No longer bleeding and mood has improved.  May have need sx for prolapsed rectum.  Sees Candis, through provider services, every Monday. Goes for walks.       PRN alprazolam and PRN Abilify:  Vacation at [facility]- needed PRN most every day. Did not want to shower- screaming and parents afraid others will call police.  Vielka SCHULTZ, in parking garage- refused to get out to eat at restaurant- screaming. Left without eating.  Still needs PRN when making plans with friends.  \"her anxiety is holding us hostage. we have had to change plans.\"  small ramps cause screaming.  Even when plans are made in advance, they will drive there ([place]) once there, she will start screaming.  \"Panic in her eyes\".   \"Off the chart\" panic - vacation= 3 x in a week.   - any time in a new environment. Parents go ahead of time and will video the route to walk in and bring it back to her. she mostly refuses to walk to the event.  - mostly stays in her room, " cries, stays in her room, states she is sick. She may change her mind and then go.      She may cry, but uses deep breathing and able to calm herself. Plans are not made too far in advanced. Ava will obsess over it until the time comes. When her parents are leaving, she often holds then up by changing her mind about whether she is going or not.     eats dinner in her own apartment- which is in her parents home  grocery shops     Has a 6 yo Border Collie named Juan.  Sleep: Denies issues falling and staying asleep.   Utilizing charts and check off list for rules and behavior. Runs the vacuum, sets the table, laundry     HX: screaming, Somatic: With complaints of headache and stomachache, slamming the doors, going to her room and refusing to come out, reported aggression (hitting/pushing?) lasting 30 min, until parents must leave in order to get to work on time.      Ava identifies more with elderly than with peers.   MEDICATIONS: Ritalin- blinking tics developed.  Anafranil = made worse, Pamelor, then Mora since age 7. Dx manic/depressive.   Senior year of HS- Imipramine- caused HS bed wetting= then Abilify  BuSpar= increased anxiety     Psy/SI/HI/aggression. Denies A/V/TH. Ava states that she is only sad when a friend dies.  Dx PDD and Bipolar age 7 yo   Med Physicians:  PCP: Dr. Webb/ [facility]. July 2018.   Dr. Webb office # [phone] = for lab & EKG results  Urologist: Feb 2018. May need cystogram. If so, will be performed in OR.  GYN:Dr. Culp. September 30, 2022 DNC- possible hysterectomy. F/U 11/1/22     Med SE wt estella since middle school  2003/2004 hyperthyroid lost 30#  2007 gall bladder removed     COMPLIANCE: good     EPS- lower leg shuffling (not noted as via zoom) and pill rolling (1) oral buccal movements with tongue jutting (1) mouth opening (1) noted. Ct states does not feel uncomfortable in her skin. Severity (1). Ct does not appear aware. With decrease in Abilify,  she has had a decrease in hand tremor.  AIMS score 4 based on the above. 5/9/23     LABS REVIEWED:  Feb 2024:  Lithium 0.90 (0.60-1.20 mmol/L)  August 2022 in chart  May 2021: in chart under May 7 2021 [facility] Patient Information  Lithium (0.8)  August 2020: Lithium (0.8), lipid, CBC, CMP- reviewed.  July 2019: Lipid, BMP, & CBC reviewed.  Feb 2019: Lithium 0.9  others reviewed     Feb 2018:  TSH- WNL  Lithium-1.0- WNL  Vit D-40.6- WNL     EKG   May 2021: in chart under May 7 2021 [facility] Patient Information  66 bpm  QTc 396     August 2019:  65 bpm  QTc 416 ms   Mental Status Exam     Appearance: well-groomed.   Build: overweight.   Demeanor: average.   Eye Contact: average.   Motor Activity: average.   Speech: clear.   Language: Neurologic language is intact.   Fund of Knowledge: fair fund of knowledge.   Delusions: None Reported.   Self Harm: None Reported.   Aggressive: None Reported.   Mood: euthymic.   Affect: full.   Orientation: alert.   Manner: cooperative.   Thought process: concrete.   Thought association: Impairment in rational thinking.   Content of thought: As noted in HPI   Abstract/ Rational Thought: minimal impairment   Memory: grossly intact.   Behavior: calm.   Intelligence Estimate: intellectual disability.   Insight: minimal impairment.   Judgement: minimal impairment.   Musculoskeletal: normal strength and tone.      Scores and Scales        Facial and Oral Movements:   Muscles of Facial Expression eg. movements of forehead, eyebrows, periorbital area, cheeks; include frowning, blinking, smiling, grimacing: None normal (0).   Lips and Perioral Area eg. puckering, pouting, smacking: Minimal (may be extreme normal) (1).   Jaw eg. biting, clenching, chewing, mouth opening, lateral movement: None normal (0).   Tongue. Rate only increases in movement both in and out of mouth, NOT inability to sustain movement Minimal (may be extreme normal) (1).   Extremity Movements:   Upper (arms, wrists, hands,  fingers). Include chronic movements ( ie, rapid, objectively purposeless, irregular, spontaneous); athetoid movements (ie, slow, irregular, complex, serpentine). DO NOT include tremor (ie, repetitive, regular, rythmic): Minimal (may be extreme normal) (1).   Lower (legs, knees, ankles, toes) eg, lateral knee movement, foot tapping, heel dropping, foot squirming, inversion and eversion of foot: None normal (0).   Trunk Movements:   Neck, shoulders, hips. eg, rocking, twisting, squirming, pelvic gyrations: None normal (0).      Overall Severity:   Severity of abnormal movements: Minimal (may be extreme normal) (1).   Incapacitation due to abnormal movements: None normal (0).   Patient's awareness of abnormal movements (rate only patient's report): No awareness (0).   Dental Status:   Current porblems with teeth and/or dentures: No.   Does patient usually wear dentures: No.

## 2024-04-24 NOTE — PATIENT INSTRUCTIONS
ASSESSMENT: Ms. Pinto presents with improvements noted with the increase in Abilify and Xanax.  However, anxiety is still noted.  See treatment plan below.     Pharmacogenomics Testing (PGT) results reviewed:   Use as Directed:    Alprazolam (Xanax), Aripiprazole (Abilify)  No Proven Genetic Markers:   Lithium     PLAN:                                                                                                                  problems treated   f/u requested to prevent relapse   medications renewed/re-ordered     1. Increase alprazolam (Xanax) 0.50 mg by mouth TID (from BID). I have personally reviewed the OARRS report 4/24/24. I have considered the risks of abuse, dependence, addiction and diversion. Benzo agreement signed 2/23/24  2. Continue Lithium 600 mg by mouth in the morning, 300 mg at 3 pm, 450 mg at bedtime for moods  3. Continue Aripiprazole (Abilify) take 5 mg by mouth daily, may take additional half TAB (2.5 mg) by mouth daily PRN for moods. Mom will contact when refill is needed.  4. Risks, benefits, alternatives, off-label uses, and side effects of medications have been discussed with patient/caregiver. There is no report of signs/symptoms consistent with medication-induced impairment in daily functioning. At this time, benefits of medication felt to outweigh potential risks. Will continue to reassess need for psychotropic medication at regular 3 to 6 month intervals.  5. Return To Clinic in 2 months or earlier if needed. Call [phone] to reschedule.     Thank you for seeing me today.  If you have any questions or concerns, do not hesitate to contact my office.  Millie Ugarte     TREATMENT TYPE                                                                                                  counseling and coordination of care addressing signs and symptoms of illness; risks/benefits and side effects of medications; and behavioral approaches to illness.  This note was created using  electronic dictation. There may be errors in syntax and meaning. Please contact the office with any questions.

## 2024-05-28 DIAGNOSIS — F41.1 GAD (GENERALIZED ANXIETY DISORDER): ICD-10-CM

## 2024-05-29 DIAGNOSIS — F41.1 GAD (GENERALIZED ANXIETY DISORDER): ICD-10-CM

## 2024-05-29 RX ORDER — ALPRAZOLAM 0.5 MG/1
0.5 TABLET ORAL 3 TIMES DAILY
Qty: 90 TABLET | Refills: 2 | Status: SHIPPED | OUTPATIENT
Start: 2024-05-29

## 2024-05-29 RX ORDER — ALPRAZOLAM 0.5 MG/1
0.5 TABLET ORAL 3 TIMES DAILY
Qty: 90 TABLET | Refills: 0 | OUTPATIENT
Start: 2024-05-29

## 2024-06-24 ENCOUNTER — APPOINTMENT (OUTPATIENT)
Dept: BEHAVIORAL HEALTH | Facility: CLINIC | Age: 39
End: 2024-06-24
Payer: COMMERCIAL

## 2024-06-24 DIAGNOSIS — F84.0 AUTISM (HHS-HCC): ICD-10-CM

## 2024-06-24 DIAGNOSIS — F41.1 GAD (GENERALIZED ANXIETY DISORDER): ICD-10-CM

## 2024-06-24 DIAGNOSIS — F31.10 BIPOLAR DISORDER, CURRENT EPISODE MANIC WITHOUT PSYCHOTIC FEATURES (MULTI): Primary | ICD-10-CM

## 2024-06-24 DIAGNOSIS — F70 MILD INTELLECTUAL DISABILITY: ICD-10-CM

## 2024-06-24 DIAGNOSIS — G47.9 SLEEP DISTURBANCES: ICD-10-CM

## 2024-06-24 PROCEDURE — 99214 OFFICE O/P EST MOD 30 MIN: CPT | Performed by: NURSE PRACTITIONER

## 2024-06-24 RX ORDER — ALPRAZOLAM 0.5 MG/1
0.5 TABLET ORAL 3 TIMES DAILY
Qty: 90 TABLET | Refills: 2 | Status: SHIPPED | OUTPATIENT
Start: 2024-06-24

## 2024-06-24 RX ORDER — LITHIUM CARBONATE 300 MG/1
TABLET, FILM COATED, EXTENDED RELEASE ORAL
Qty: 360 TABLET | Refills: 1 | Status: SHIPPED | OUTPATIENT
Start: 2024-06-24

## 2024-06-24 RX ORDER — ARIPIPRAZOLE 5 MG/1
TABLET ORAL
Qty: 135 TABLET | Refills: 1 | Status: SHIPPED | OUTPATIENT
Start: 2024-06-24

## 2024-06-24 RX ORDER — LITHIUM CARBONATE 150 MG/1
150 CAPSULE ORAL NIGHTLY
Qty: 90 CAPSULE | Refills: 1 | Status: SHIPPED | OUTPATIENT
Start: 2024-06-24 | End: 2024-12-21

## 2024-06-24 NOTE — PROGRESS NOTES
ASSESSMENT: Ms. Pinto presents with improvements noted with the increase in Abilify and Xanax.  However, anxiety is still noted.  See treatment plan below.     Pharmacogenomics Testing (PGT) results reviewed:   Use as Directed:    Alprazolam (Xanax), Aripiprazole (Abilify)  No Proven Genetic Markers:   Lithium     PLAN:                                                                                                                  problems treated   f/u requested to prevent relapse   medications renewed/re-ordered     1. Continue alprazolam (Xanax) 0.50 mg by mouth TID. I have personally reviewed the OARRS report 6/24/24. I have considered the risks of abuse, dependence, addiction and diversion. Benzo agreement signed 2/23/24  2. Continue Lithium 600 mg by mouth in the morning, 300 mg at 3 pm, 450 mg at bedtime for moods  3. Continue Aripiprazole (Abilify) take 5 mg by mouth daily, may take additional half TAB (2.5 mg) by mouth daily PRN for moods. Mom will contact when refill is needed.  4. Risks, benefits, alternatives, off-label uses, and side effects of medications have been discussed with patient/caregiver. There is no report of signs/symptoms consistent with medication-induced impairment in daily functioning. At this time, benefits of medication felt to outweigh potential risks. Will continue to reassess need for psychotropic medication at regular 3 to 6 month intervals.  5. Return To Clinic in 5 months or earlier if needed. Call [phone] to reschedule.     Thank you for seeing me today.  If you have any questions or concerns, do not hesitate to contact my office.  Millie Ugarte     TREATMENT TYPE                                                                                                  counseling and coordination of care addressing signs and symptoms of illness; risks/benefits and side effects of medications; and behavioral approaches to illness.  This note was created using electronic  dictation. There may be errors in syntax and meaning. Please contact the office with any questions.   For [facility] residents, Mobile Crisis is a 24/7 hotline you can call for assistance [[phone]].   Please call 911 or go to your closest Emergency Room if you feel worse. This includes thoughts of hurting yourself or anyone else, or having other troubles such as hearing voices, seeing visions, or having new and scary thoughts about the people around you.      Provider Impressions     PRESENT FOR APPOINTMENT  Client  Mother Bessy Pinto  Father: Mohamud Blair Bray, [employer]IK[employer] student with consent.    An interactive audio and video telecommunication system which permits real time communications between the patient (at the originating site) and provider (at the distant site) was utilized to provide this telehealth service.    Verbal consent was requested and obtained from Ct on this date, for a telehealth visit.    SUBJECTIVE: 39 yo CF with a history of Autism, MATTHEW, ID, OCD and Bipolar DO presenting for medication management.      Last seen VV April 2024. At that time, alprazolam was increased.  Last seen in person February 2024.  At that time, alprazolam was increased.  In interim, 3 days later, Abilify was increased.  October 2023. At that time, no medication changes.  May 2023.  At that time, no medication changes.  November 2022. At that time, no medication changes.  October 2022. At that time, alprazolam PRN was increased.   April 2022. At that time, no medication changes.  January 2022. At that time, no medication changes.  October 2021. At that time, no medication changes.   July 2021. At that time, alprazolam PRN was decreased.   April 2021.At that time, no medication changes.   January 2021.At that time, no medication changes.   October 2020. At that time, no medication changes.   July 2020. At that time, Abilify was decreased and PRN Alprazolam was increased.  November 2019. At that time, no med  changes.  August 2019. At that time, no med changes.  April 2019. At that time, no med changes.  January 2019. At that time, Melatonin was started.   August 2018. At that time, no med changes.  July 2018. At that time, recommended moving majority of dose [place] to HS.   April 2018. At that time, no medicine changes.  February 2018: Alprazolam was discontinued due to complaints of dizziness  Jan 2018: BuSpar was discontinued and lithium was increased.  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 At that time, BuSpar was started.    Ava reports that she that she is sleeping well and has been going out with caregiver to the park and out walking. She states that she is taking a break from baseball. Family explains that she went to [place] with caregiver but only rode one ride which is one that she rode last year. Goes on outings 2x/week.    Mom and Dad report with med increases, calmer, with less outbursts tolerates parents leaving. Did report that while parents left about a week ago patient was banging on window and yelling at the deer when pet dog became anxious. Patient started running and dog went after her and bit her. She presented to ED and stitches were placed but no apprehension/ anxiety over this on return to home. Patient continues to interact with dog as she previously did.  Continues to go on walks with Dad.  Still worries about bridges, hills, and stairs. Previously increased anxiety, anticipatory anxiety, panic and agoraphobia.      Was seeing Ale monthly for therapy, but since Summer began has decreased this as patient has been busy. Plan to follow up with Ale soon.     October 2022 DNC. discovered rectal prolapse. Saw colorectal sx in [facility] & colorectal hysterectomy- decided no Sx but IUD. No longer bleeding and mood has improved.  May have need sx for prolapsed rectum.  Sees Candis, through provider services, every Monday. Goes for walks.       PRN alprazolam and PRN Abilify:  Vacation at [facility]-  "needed PRN most every day. Did not want to shower- screaming and parents afraid others will call police.  Vielka SCHULTZ, in parking garage- refused to get out to eat at restaurant- screaming. Left without eating.  Still needs PRN when making plans with friends.  \"her anxiety is holding us hostage. we have had to change plans.\"  small ramps cause screaming.  Even when plans are made in advance, they will drive there ([place] once there, she will start screaming.  \"Panic in her eyes\".   \"Off the chart\" panic - vacation= 3 x in a week.   - any time in a new environment. Parents go ahead of time and will video the route to walk in and bring it back to her. she mostly refuses to walk to the event.  - mostly stays in her room, cries, stays in her room, states she is sick. She may change her mind and then go.      She may cry, but uses deep breathing and able to calm herself. Plans are not made too far in advanced. Ava will obsess over it until the time comes. When her parents are leaving, she often holds then up by changing her mind about whether she is going or not.     eats dinner in her own apartment- which is in her parents home  grocery shops     Has a 6 yo Border Collie named Juan.  Sleep: Denies issues falling and staying asleep.   Utilizing charts and check off list for rules and behavior. Runs the vacuum, sets the table, laundry     HX: screaming, Somatic: With complaints of headache and stomachache, slamming the doors, going to her room and refusing to come out, reported aggression (hitting/pushing?) lasting 30 min, until parents must leave in order to get to work on time.      Ava identifies more with elderly than with peers.   MEDICATIONS: Ritalin- blinking tics developed.  Anafranil = made worse, Pamelor, then [place] since age 7. Dx manic/depressive.   Senior year of HS- Imipramine- caused HS bed wetting= then Abilify  BuSpar= increased anxiety     Psy/SI/HI/aggression. Denies A/V/TH. Ava states " that she is only sad when a friend dies.  Dx PDD and Bipolar age 5 yo   Med Physicians:  PCP: Dr. Webb/ [facility]. July 2018.   Dr. Webb office # [phone] = for lab & EKG results  Urologist: Feb 2018. May need cystogram. If so, will be performed in OR.  GYN:Dr. Culp. September 30, 2022 DNC- possible hysterectomy. F/U 11/1/22     Med SE wt estella since middle school  2003/2004 hyperthyroid lost 30#  2007 gall bladder removed     COMPLIANCE: good     EPS- lower leg shuffling (not noted as via zoom) and pill rolling (1) oral buccal movements with tongue jutting (1) mouth opening (1) noted. Ct states does not feel uncomfortable in her skin. Severity (1). Ct does not appear aware. With decrease in Abilify, she has had a decrease in hand tremor.  AIMS score 4 based on the above. 5/9/23     LABS REVIEWED:  Feb 2024:  Lithium 0.90 (0.60-1.20 mmol/L)  August 2022 in chart  May 2021[facility]  Lithium (0.8)  August 2020: Lithium (0.8), lipid, CBC, CMP- reviewed.  July 2019: Lipid, BMP, & CBC reviewed.  Feb 2019: Lithium 0.9  others reviewed     Feb 2018:  TSH- WNL  Lithium-1.0- WNL  Vit D-40.6- WNL     EKG   May 2021: in chart under May 7 2021 sPsych Patient Information  66 bpm  QTc 396     August 2019:  65 bpm  QTc 416 ms   Mental Status Exam     Appearance: well-groomed.   Build: overweight.   Demeanor: average.   Eye Contact: average.   Motor Activity: average.   Speech: clear.   Language: Neurologic language is intact.   Fund of Knowledge: fair fund of knowledge.   Delusions: None Reported.   Self Harm: None Reported.   Aggressive: None Reported.   Mood: euthymic.   Affect: full.   Orientation: alert.   Manner: cooperative.   Thought process: concrete.   Thought association: Impairment in rational thinking.   Content of thought: As noted in HPI   Abstract/ Rational Thought: minimal impairment   Memory: grossly intact.   Behavior: calm.   Intelligence  Estimate: intellectual disability.   Insight: minimal impairment.   Judgement: minimal impairment.   Musculoskeletal: normal strength and tone.      Scores and Scales        Facial and Oral Movements:   Muscles of Facial Expression eg. movements of forehead, eyebrows, periorbital area, cheeks; include frowning, blinking, smiling, grimacing: None normal (0).   Lips and Perioral Area eg. puckering, pouting, smacking: Minimal (may be extreme normal) (1).   Jaw eg. biting, clenching, chewing, mouth opening, lateral movement: None normal (0).   Tongue. Rate only increases in movement both in and out of mouth, NOT inability to sustain movement Minimal (may be extreme normal) (1).   Extremity Movements:   Upper (arms, wrists, hands, fingers). Include chronic movements ( ie, rapid, objectively purposeless, irregular, spontaneous); athetoid movements (ie, slow, irregular, complex, serpentine). DO NOT include tremor (ie, repetitive, regular, rythmic): Minimal (may be extreme normal) (1).   Lower (legs, knees, ankles, toes) eg, lateral knee movement, foot tapping, heel dropping, foot squirming, inversion and eversion of foot: None normal (0).   Trunk Movements:   Neck, shoulders, hips. eg, rocking, twisting, squirming, pelvic gyrations: None normal (0).      Overall Severity:   Severity of abnormal movements: Minimal (may be extreme normal) (1).   Incapacitation due to abnormal movements: None normal (0).   Patient's awareness of abnormal movements (rate only patient's report): No awareness (0).   Dental Status:   Current porblems with teeth and/or dentures: No.   Does patient usually wear dentures: No.

## 2024-08-02 ENCOUNTER — APPOINTMENT (OUTPATIENT)
Dept: BEHAVIORAL HEALTH | Facility: CLINIC | Age: 39
End: 2024-08-02
Payer: MEDICARE

## 2024-08-02 DIAGNOSIS — F70 MILD INTELLECTUAL DISABILITY: ICD-10-CM

## 2024-08-02 DIAGNOSIS — F31.10 BIPOLAR DISORDER, CURRENT EPISODE MANIC WITHOUT PSYCHOTIC FEATURES (MULTI): ICD-10-CM

## 2024-08-02 DIAGNOSIS — F41.1 GAD (GENERALIZED ANXIETY DISORDER): ICD-10-CM

## 2024-08-02 PROCEDURE — 90832 PSYTX W PT 30 MINUTES: CPT | Performed by: COUNSELOR

## 2024-08-02 NOTE — PROGRESS NOTES
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)  Visit Type: epic  Reason for visit: managing her worry and mood     - notes she went to the eshtery [facility] and saw all the animals and to [place], notes she is going to the rubber ducks game next week  - she notes she has been staying calm, likes her new med and its really helping  - notes she has been getting out of the house without any issues with her provider   - notes she has an apt with her foot doc on Thursday, she has to have her nails trimmed  - reports she is using her skills, watching TV ([employer]), using her books, walking  - reports she has not gone to lunch with her friends, stated they were busy   - parents: mom notes she completed her radiation, will start 5 years of pills, dog tore ACL (wearing a cone), dad had more surgeries, they feel really tied to the house with all the apts, so not much activity, so shes mostly been out with her provider, but no real issues with her/going out, some still issues with stairs/walking in general    focused on:  - active listening  - parents want to focus on her activity level  - how she will hyper focus on an activity and be excited but then cancel it or work herself up, incessant talking (next time will share screen and make a list together and parents can print and she can use like a check list)  - coping skills; distraction techniques, walking stick  - reviewed response should match the problem, small problem, small response

## 2024-08-30 ENCOUNTER — APPOINTMENT (OUTPATIENT)
Dept: BEHAVIORAL HEALTH | Facility: CLINIC | Age: 39
End: 2024-08-30
Payer: MEDICARE

## 2024-08-30 DIAGNOSIS — F31.10 BIPOLAR DISORDER, CURRENT EPISODE MANIC WITHOUT PSYCHOTIC FEATURES (MULTI): ICD-10-CM

## 2024-08-30 DIAGNOSIS — F41.1 GAD (GENERALIZED ANXIETY DISORDER): ICD-10-CM

## 2024-08-30 DIAGNOSIS — F70 MILD INTELLECTUAL DISABILITY: ICD-10-CM

## 2024-08-30 PROCEDURE — 90837 PSYTX W PT 60 MINUTES: CPT | Performed by: COUNSELOR

## 2024-08-30 NOTE — PROGRESS NOTES
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)  Visit Type: epic  Reason for visit: managing her worry and mood     - notes she went to another fair but did have a hard time with her provider; we spent time talking about so much excitement that turns to worry/nerves, what makes the difference, is the time, place, person, activity, etc; she kept responding with “I don't know” and was challenged to stop and think about her answers first   - notes her sister and her family are coming over for Labor Day   - notes she got nervous going down the stairs   - we spent time talking about her talking, does she get nervous if its quiet in the house or with her provider, she said no  - talked about the weather and does it help to look or not  - parents noted worry again about her walking/activity level; carpet strips on the basement steps, small baby steps outside, taking forever, reduced activity level overall, refusing to get out of the car, etc     focused on:  - active listening  - parents want to focus on her activity level  - talk time with mom and dad, worry less, look at the weather, next time share screen and add a few things/activities (hyper focus on an activity then cancel it, incessant talking)  - coping skills; distraction techniques, walking stick, reviewed response should match the problem, small problem, small response

## 2024-09-27 ENCOUNTER — APPOINTMENT (OUTPATIENT)
Dept: BEHAVIORAL HEALTH | Facility: CLINIC | Age: 39
End: 2024-09-27
Payer: MEDICARE

## 2024-09-27 DIAGNOSIS — F70 MILD INTELLECTUAL DISABILITY: ICD-10-CM

## 2024-09-27 DIAGNOSIS — F31.10 BIPOLAR DISORDER, CURRENT EPISODE MANIC WITHOUT PSYCHOTIC FEATURES: ICD-10-CM

## 2024-09-27 DIAGNOSIS — F41.1 GAD (GENERALIZED ANXIETY DISORDER): ICD-10-CM

## 2024-09-27 PROCEDURE — 90834 PSYTX W PT 45 MINUTES: CPT | Performed by: COUNSELOR

## 2024-09-27 NOTE — PROGRESS NOTES
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)  Visit Type: epic  Reason for visit: managing her worry and mood     - notes she went to her nieces [employer] party and did well  - got her flu and covid shot, went out with her provider and walked the track  - notes she went out to lunch with her parents, is looking forward to raking leaves  - is having her mammogram in a week, states she is going to stay calm (spent time talking about what to expect and ways to prep)  - parents: still some “I don't want to” but has been managing her “meltdowns” better, weather prep, her anxiety related to the terrain with her walks/stairs, is it wet, etc    focused on:  - shared screen and went over her new checklist, what to expect, and what we might add each time; stop and think to list  - active listening  - coping skills; distraction techniques

## 2024-10-25 ENCOUNTER — APPOINTMENT (OUTPATIENT)
Dept: BEHAVIORAL HEALTH | Facility: CLINIC | Age: 39
End: 2024-10-25
Payer: MEDICARE

## 2024-10-25 DIAGNOSIS — F41.1 GAD (GENERALIZED ANXIETY DISORDER): ICD-10-CM

## 2024-10-25 DIAGNOSIS — F31.10 BIPOLAR DISORDER, CURRENT EPISODE MANIC WITHOUT PSYCHOTIC FEATURES: ICD-10-CM

## 2024-10-25 DIAGNOSIS — F70 MILD INTELLECTUAL DISABILITY: ICD-10-CM

## 2024-10-25 PROCEDURE — 90832 PSYTX W PT 30 MINUTES: CPT | Performed by: COUNSELOR

## 2024-10-25 NOTE — PROGRESS NOTES
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)  Visit Type: epic  Reason for visit: managing her worry, mood    - notes she has been practicing stairs at the [facility] and at home and feels proud  - notes she feels excited to work on her checklist and has been doing really good  - notes fall has been good, she enjoys the changing leaves, got coffee with her sister, went to the zoo  - parents note things have been really good, some concern heading into the winter weather    focused on:  - shared screen and went over her new checklist, what to expect, and what we might add each time; stop and think to list; added a new item  - active listening  - coping skills; distraction techniques

## 2024-11-18 ENCOUNTER — APPOINTMENT (OUTPATIENT)
Dept: BEHAVIORAL HEALTH | Facility: CLINIC | Age: 39
End: 2024-11-18
Payer: MEDICARE

## 2024-11-18 DIAGNOSIS — G47.9 SLEEP DISTURBANCES: ICD-10-CM

## 2024-11-18 DIAGNOSIS — F31.10 BIPOLAR DISORDER, CURRENT EPISODE MANIC WITHOUT PSYCHOTIC FEATURES: Primary | ICD-10-CM

## 2024-11-18 DIAGNOSIS — F84.0 AUTISM (HHS-HCC): ICD-10-CM

## 2024-11-18 DIAGNOSIS — F41.1 GAD (GENERALIZED ANXIETY DISORDER): ICD-10-CM

## 2024-11-18 DIAGNOSIS — F70 MILD INTELLECTUAL DISABILITY: ICD-10-CM

## 2024-11-18 PROCEDURE — 99214 OFFICE O/P EST MOD 30 MIN: CPT | Performed by: NURSE PRACTITIONER

## 2024-11-18 RX ORDER — LITHIUM CARBONATE 150 MG/1
150 CAPSULE ORAL NIGHTLY
Qty: 90 CAPSULE | Refills: 0 | Status: SHIPPED | OUTPATIENT
Start: 2024-11-18

## 2024-11-18 RX ORDER — ARIPIPRAZOLE 5 MG/1
TABLET ORAL
Qty: 135 TABLET | Refills: 0 | Status: SHIPPED | OUTPATIENT
Start: 2024-11-18

## 2024-11-18 RX ORDER — LITHIUM CARBONATE 300 MG/1
TABLET, FILM COATED, EXTENDED RELEASE ORAL
Qty: 360 TABLET | Refills: 0 | Status: SHIPPED | OUTPATIENT
Start: 2024-11-18

## 2024-11-18 RX ORDER — ALPRAZOLAM 0.5 MG/1
0.5 TABLET ORAL 3 TIMES DAILY
Qty: 90 TABLET | Refills: 2 | Status: SHIPPED | OUTPATIENT
Start: 2024-11-18

## 2024-11-18 NOTE — PROGRESS NOTES
"ASSESSMENT: Ms. Pinto presents at baseline mental health wise.  Parents report \"it is nice to have our Ava back\".  See treatment plan below.     Pharmacogenomics Testing (PGT) results reviewed:   Use as Directed:    Alprazolam (Xanax), Aripiprazole (Abilify)  No Proven Genetic Markers:   Lithium     PLAN:                                                                                                            problems treated   f/u requested to prevent relapse   medications renewed/re-ordered     1. Continue alprazolam (Xanax) 0.50 mg by mouth TID. I have personally reviewed the OARRS report 11/18/24. I have considered the risks of abuse, dependence, addiction and diversion. Benzo agreement signed 2/23/24  2. Continue Lithium 600 mg by mouth in the morning, 300 mg at 3 pm, 450 mg at bedtime for moods  3. Continue Aripiprazole (Abilify) take 5 mg by mouth daily, may take additional half tablet (2.5 mg) by mouth daily PRN for moods. Mom will contact when refill is needed.  4. Risks, benefits, alternatives, off-label uses, and side effects of medications have been discussed with patient/caregiver. There is no report of signs/symptoms consistent with medication-induced impairment in daily functioning. At this time, benefits of medication felt to outweigh potential risks. Will continue to reassess need for psychotropic medication at regular 3 to 6 month intervals.  5. Return To Clinic Tuesday, February 25, 2025 at 11 AM virtual or earlier if needed. Call [phone] to reschedule.     Thank you for seeing me today.  If you have any questions or concerns, do not hesitate to contact my office.  Millie Ugarte     TREATMENT TYPE                                                                                                  counseling and coordination of care addressing signs and symptoms of illness; risks/benefits and side effects of medications; and behavioral approaches to illness.  This note was created using " electronic dictation. There may be errors in syntax and meaning. Please contact the office with any questions.   For [facility] residents, Mobile Crisis is a 24/7 hotline you can call for assistance [[phone]].   Please call 911 or go to your closest Emergency Room if you feel worse. This includes thoughts of hurting yourself or anyone else, or having other troubles such as hearing voices, seeing visions, or having new and scary thoughts about the people around you.      Provider Impressions     PRESENT FOR APPOINTMENT  Client  Mother Bessy Pinto  Father: Mohamud Pinto     An interactive audio and video telecommunication system which permits real time communications between the patient (at the originating site) and provider (at the distant site) was utilized to provide this telehealth service.    Verbal consent was requested and obtained from Ct on this date, for a telehealth visit.     SUBJECTIVE: 38 yo CF with a history of Autism, MATTHEW, ID, OCD and Bipolar DO presenting for medication management.      Last seen June 2024. At that time, no medication changes.  April 2024. At that time, alprazolam was increased.  Last seen in person February 2024.  At that time, alprazolam was increased.  In interim, 3 days later, Abilify was increased.  October 2023. At that time, no medication changes.  May 2023.  At that time, no medication changes.  November 2022. At that time, no medication changes.  October 2022. At that time, alprazolam PRN was increased.   April 2022. At that time, no medication changes.  January 2022. At that time, no medication changes.  October 2021. At that time, no medication changes.   July 2021. At that time, alprazolam PRN was decreased.   April 2021.At that time, no medication changes.   January 2021.At that time, no medication changes.   October 2020. At that time, no medication changes.   July 2020. At that time, Abilify was decreased and PRN Alprazolam was increased.  November 2019.  "At that time, no med changes.  August 2019. At that time, no med changes.  April 2019. At that time, no med changes.  January 2019. At that time, Melatonin was started.   August 2018. At that time, no med changes.  July 2018. At that time, recommended moving majority of dose [place] to HS.   April 2018. At that time, no medicine changes.  February 2018: Alprazolam was discontinued due to complaints of dizziness  Jan 2018: BuSpar was discontinued and lithium was increased.  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 At that time, BuSpar was started.     Ava reports that she has been using her coping skills to remain calm.  She is looking forward to Thanksgiving where some of her family will be coming over.  She remains social and interactive during assessment.  Parents report that they have been utilizing a calendar to help her overcome her fear of walking.  They have utilized exposure therapy to help her overcome her fear of steps and bridges.  For example, she held onto her father's arm to go up and down stairs.    Mom and Dad report with med increases, calmer, with less outbursts tolerates parents leaving.     Continues to go on walks with Dad.  Still worries about bridges, hills, and stairs. Previously increased anxiety, anticipatory anxiety, panic and agoraphobia.       Was seeing Ale monthly for therapy.      October 2022 DNC. discovered rectal prolapse. [facility] colorectal sx in [facility] & colorectal hysterectomy- decided no Sx but IUD. No longer bleeding and mood has improved.  May have need sx for prolapsed rectum.  Sees Candis, through provider services, every Monday. Goes for walks.       PRN alprazolam and PRN Abilify:  Vacation at [facility]- needed PRN most every day. Did not want to shower- screaming and parents afraid others will call police.  [place], in parking garage- refused to get out to eat at restaurant- screaming. Left without eating.  Still needs PRN when making plans with friends.  \"her anxiety is " "holding us hostage. we have had to change plans.\"  small ramps cause screaming.  Even when plans are made in advance, they will drive there ([place]) once there, she will start screaming.  \"Panic in her eyes\".   \"Off the chart\" panic - vacation= 3 x in a week.   - any time in a new environment. Parents go ahead of time and will video the route to walk in and bring it back to her. she mostly refuses to walk to the event.  - mostly stays in her room, cries, stays in her room, states she is sick. She may change her mind and then go.      She may cry, but uses deep breathing and able to calm herself. Plans are not made too far in advanced. Ava will obsess over it until the time comes. When her parents are leaving, she often holds then up by changing her mind about whether she is going or not.     eats dinner in her own apartment- which is in her parents home  grocery shops     Has a 9 yo Border Collie named Juan.  Sleep: Denies issues falling and staying asleep.   Utilizing charts and check off list for rules and behavior. Runs the vacuum, sets the table, laundry     HX: screaming, Somatic: With complaints of headache and stomachache, slamming the doors, going to her room and refusing to come out, reported aggression (hitting/pushing?) lasting 30 min, until parents must leave in order to get to work on time.      Ava identifies more with elderly than with peers.   MEDICATIONS: Ritalin- blinking tics developed.  Anafranil = made worse, Pamelor, then [place] since age 7. Dx manic/depressive.   Senior year of HS- Imipramine- caused HS bed wetting= then Abilify  BuSpar= increased anxiety     Psy/SI/HI/aggression. Denies A/V/TH. Ava states that she is only sad when a friend dies.  Dx PDD and Bipolar age 7 yo   Med Physicians:  PCP: Dr. Webb/ [facility]. July 2018.   Dr. Webb office # [phone] = for lab & EKG results  Urologist: Feb 2018. May need cystogram. If so, will be performed in " OR.  GYN:Dr. Culp. September 30, 2022 DNC- possible hysterectomy. F/U 11/1/22     Med SE wt estella since middle school  2003/2004 hyperthyroid lost 30#  2007 gall bladder removed      COMPLIANCE: good     EPS- lower leg shuffling (not noted as via zoom) and pill rolling (1) oral buccal movements with tongue jutting (1) mouth opening (1) noted. Ct states does not feel uncomfortable in her skin. Severity (1). Ct does not appear aware. With decrease in Abilify, she has had a decrease in hand tremor.  AIMS score 4 based on the above. 5/9/23     LABS REVIEWED:  Feb 2024:  Lithium 0.90 (0.60-1.20 mmol/L)  August 2022 in chart  May 2021: in chart under May 7 2021 sPsych Patient Information  Lithium (0.8)  August 2020: Lithium (0.8), lipid, CBC, CMP- reviewed.  July 2019: Lipid, BMP, & CBC reviewed.  Feb 2019: Lithium 0.9  others reviewed     Feb 2018:  TSH- WNL  Lithium-1.0- WNL  Vit D-40.6- WNL     EKG   May 2021: in chart under May 7 2021 sPsych Patient Information  66 bpm  QTc 396     August 2019:  65 bpm  QTc 416 ms   Mental Status Exam     Appearance: well-groomed.   Build: overweight.   Demeanor: average.   Eye Contact: average.   Motor Activity: average.   Speech: clear.   Language: Neurologic language is intact.   Fund of Knowledge: fair fund of knowledge.   Delusions: None Reported.   Self Harm: None Reported.   Aggressive: None Reported.   Mood: euthymic.   Affect: full.   Orientation: alert.   Manner: cooperative.   Thought process: concrete.   Thought association: Impairment in rational thinking.   Content of thought: As noted in HPI   Abstract/ Rational Thought: minimal impairment   Memory: grossly intact.   Behavior: calm.   Intelligence Estimate: intellectual disability.   Insight: minimal impairment.   Judgement: minimal impairment.   Musculoskeletal: normal strength and tone.

## 2024-11-18 NOTE — PATIENT INSTRUCTIONS
"ASSESSMENT: Ms. Pinto presents at baseline mental health wise.  Parents report \"it is nice to have our Ava back\".  See treatment plan below.     Pharmacogenomics Testing (PGT) results reviewed:   Use as Directed:    Alprazolam (Xanax), Aripiprazole (Abilify)  No Proven Genetic Markers:   Lithium     PLAN:                                                                                                            problems treated   f/u requested to prevent relapse   medications renewed/re-ordered     1. Continue alprazolam (Xanax) 0.50 mg by mouth TID. I have personally reviewed the OARRS report 11/18/24. I have considered the risks of abuse, dependence, addiction and diversion. Benzo agreement signed 2/23/24  2. Continue Lithium 600 mg by mouth in the morning, 300 mg at 3 pm, 450 mg at bedtime for moods  3. Continue Aripiprazole (Abilify) take 5 mg by mouth daily, may take additional half tablet (2.5 mg) by mouth daily PRN for moods. Mom will contact when refill is needed.  4. Risks, benefits, alternatives, off-label uses, and side effects of medications have been discussed with patient/caregiver. There is no report of signs/symptoms consistent with medication-induced impairment in daily functioning. At this time, benefits of medication felt to outweigh potential risks. Will continue to reassess need for psychotropic medication at regular 3 to 6 month intervals.  5. Return To Clinic Tuesday, February 25, 2025 at 11 AM virtual or earlier if needed. Call [phone] to reschedule.     Thank you for seeing me today.  If you have any questions or concerns, do not hesitate to contact my office.  Millie Ugarte     TREATMENT TYPE                                                                                                  counseling and coordination of care addressing signs and symptoms of illness; risks/benefits and side effects of medications; and behavioral approaches to illness.  This note was created using " electronic dictation. There may be errors in syntax and meaning. Please contact the office with any questions.

## 2024-12-06 ENCOUNTER — APPOINTMENT (OUTPATIENT)
Dept: BEHAVIORAL HEALTH | Facility: CLINIC | Age: 39
End: 2024-12-06
Payer: MEDICARE

## 2024-12-06 DIAGNOSIS — F41.1 GAD (GENERALIZED ANXIETY DISORDER): ICD-10-CM

## 2024-12-06 DIAGNOSIS — F31.10 BIPOLAR DISORDER, CURRENT EPISODE MANIC WITHOUT PSYCHOTIC FEATURES: ICD-10-CM

## 2024-12-06 DIAGNOSIS — F70 MILD INTELLECTUAL DISABILITY: ICD-10-CM

## 2024-12-06 PROCEDURE — 90832 PSYTX W PT 30 MINUTES: CPT | Performed by: COUNSELOR

## 2024-12-06 NOTE — PROGRESS NOTES
"Total Time: 36 min  Diagnosis: MATTHEW, Bipolar disorder, current episode manic w/out psychotic features, Mild intellectual disability  Visit Type: epic  Reason for visit: managing her worry and mood     - notes she had a great Halloween and thanksgiving, saw Alyssa already  - notes she is going to lunch with the Mayor; she is really excited  - talked about the stairs, the snow and her check list  - reports feeling \"different\" when asked how she said \"happy\"      focused on:  - shared screen and went over her new checklist, what to expect, and what we might add each time; stop and think to list  - active listening  - coping skills; distraction techniques  - 2025 resolutions, increase her exercise (20 min) and go out with family (list of places to go)    "

## 2025-01-17 ENCOUNTER — APPOINTMENT (OUTPATIENT)
Dept: BEHAVIORAL HEALTH | Facility: CLINIC | Age: 40
End: 2025-01-17
Payer: MEDICARE

## 2025-01-17 DIAGNOSIS — F70 MILD INTELLECTUAL DISABILITY: ICD-10-CM

## 2025-01-17 DIAGNOSIS — F31.10 BIPOLAR DISORDER, CURRENT EPISODE MANIC WITHOUT PSYCHOTIC FEATURES: ICD-10-CM

## 2025-01-17 DIAGNOSIS — F41.1 GAD (GENERALIZED ANXIETY DISORDER): ICD-10-CM

## 2025-01-17 PROCEDURE — 90834 PSYTX W PT 45 MINUTES: CPT | Performed by: COUNSELOR

## 2025-01-17 NOTE — PROGRESS NOTES
Total Time: 39 min  Diagnosis: MATTHEW, Bipolar disorder, current episode manic without psychotic features, Mild intellectual disability  Visit Type: epic  Reason for visit: managing her worry and mood       - notes her lunch with the Mayor was really nice  - [place] was really good (got everyone gifts and stayed calm), so was new years  - notes she went to get her hair cut and refused; the woman that works there had to come get her  - notes she is going out with her provider tomorrow, jacque; saw wicked  - notes she is working on her sticker chart, is doing really good, feels positive about it and is doing her treadmill  - parents note: holidays were really good, more social, stayed longer, no meltdowns      focused on:  - STOP think, first and second options (wear extra clothes/layers, walking stick)  - active listening  - coping skills; distraction techniques  - continuing with her 2025 resolutions (exercise go out with family)

## 2025-02-24 ENCOUNTER — TELEPHONE (OUTPATIENT)
Dept: BEHAVIORAL HEALTH | Facility: CLINIC | Age: 40
End: 2025-02-24
Payer: MEDICARE

## 2025-02-24 NOTE — PROGRESS NOTES
Request for Abilify. Mom notified that this will be renewed tomorrow at appointment with  Keira ALCOCER.

## 2025-02-25 ENCOUNTER — APPOINTMENT (OUTPATIENT)
Dept: BEHAVIORAL HEALTH | Facility: CLINIC | Age: 40
End: 2025-02-25
Payer: MEDICARE

## 2025-02-25 ENCOUNTER — TELEPHONE (OUTPATIENT)
Dept: OTHER | Age: 40
End: 2025-02-25

## 2025-02-25 DIAGNOSIS — F41.1 GAD (GENERALIZED ANXIETY DISORDER): ICD-10-CM

## 2025-02-25 DIAGNOSIS — Z79.899 ENCOUNTER FOR LONG-TERM (CURRENT) USE OF HIGH-RISK MEDICATION: ICD-10-CM

## 2025-02-25 DIAGNOSIS — G47.9 SLEEP DISTURBANCES: ICD-10-CM

## 2025-02-25 DIAGNOSIS — F84.0 AUTISM (HHS-HCC): ICD-10-CM

## 2025-02-25 DIAGNOSIS — F31.10 BIPOLAR DISORDER, CURRENT EPISODE MANIC WITHOUT PSYCHOTIC FEATURES: ICD-10-CM

## 2025-02-25 DIAGNOSIS — F70 MILD INTELLECTUAL DISABILITY: ICD-10-CM

## 2025-02-25 RX ORDER — LITHIUM CARBONATE 300 MG/1
TABLET, FILM COATED, EXTENDED RELEASE ORAL
Qty: 360 TABLET | Refills: 0 | Status: SHIPPED | OUTPATIENT
Start: 2025-02-25

## 2025-02-25 RX ORDER — ALPRAZOLAM 0.5 MG/1
0.5 TABLET ORAL 3 TIMES DAILY
Qty: 90 TABLET | Refills: 2 | Status: SHIPPED | OUTPATIENT
Start: 2025-02-25

## 2025-02-25 RX ORDER — LITHIUM CARBONATE 150 MG/1
150 CAPSULE ORAL NIGHTLY
Qty: 90 CAPSULE | Refills: 0 | Status: SHIPPED | OUTPATIENT
Start: 2025-02-25

## 2025-02-25 NOTE — PROGRESS NOTES
"No show              ASSESSMENT: Ms. Pinto presents at baseline mental health wise.  Parents report \"it is nice to have our Ava back\".  See treatment plan below.     Pharmacogenomics Testing (PGT) results reviewed:   Use as Directed:    Alprazolam (Xanax), Aripiprazole (Abilify)  No Proven Genetic Markers:   Lithium     PLAN:                                                                                                            problems treated   f/u requested to prevent relapse   medications renewed/re-ordered     1. Continue alprazolam (Xanax) 0.50 mg by mouth TID. I have personally reviewed the OARRS report 2/25/2025. I have considered the risks of abuse, dependence, addiction and diversion. Benzo agreement signed 2/23/24.  New forms sent via email 2/25/2025.  2. Continue Lithium 600 mg by mouth in the morning, 300 mg at 3 pm, 450 mg at bedtime for moods  3. Continue Aripiprazole (Abilify) take 5 mg by mouth daily, may take additional half tablet (2.5 mg) by mouth daily PRN for moods. Mom will contact when refill is needed.  4. Risks, benefits, alternatives, off-label uses, and side effects of medications have been discussed with patient/caregiver. There is no report of signs/symptoms consistent with medication-induced impairment in daily functioning. At this time, benefits of medication felt to outweigh potential risks. Will continue to reassess need for psychotropic medication at regular 3 to 6 month intervals.  5. Return To Clinic 3 months virtual or earlier if needed. Call [phone] to reschedule.  6.  Prescription given for EKG, fasting labs, lithium serum level, drug and benzodiazepine drug screen.  No lithium for at least 10 hours prior to lab draw.     Thank you for seeing me today.  If you have any questions or concerns, do not hesitate to contact my office.  Millie Ugarte     TREATMENT TYPE                                                                                                "   counseling and coordination of care addressing signs and symptoms of illness; risks/benefits and side effects of medications; and behavioral approaches to illness.  This note was created using electronic dictation. There may be errors in syntax and meaning. Please contact the office with any questions.   For [facility] residents, [facility] is a 24/7 hotline you can call for assistance [[phone]].   Please call 911 or go to your closest Emergency Room if you feel worse. This includes thoughts of hurting yourself or anyone else, or having other troubles such as hearing voices, seeing visions, or having new and scary thoughts about the people around you.      Provider Impressions     PRESENT FOR APPOINTMENT  Client  Mother Bessy Pinto  Father: Mohamud Blair     An interactive audio and video telecommunication system which permits real time communications between the patient (at the originating site) and provider (at the distant site) was utilized to provide this telehealth service.    Verbal consent was requested and obtained from Ct on this date, for a telehealth visit.     SUBJECTIVE: 38 yo CF with a history of Autism, MATTHEW, ID, OCD and Bipolar DO presenting for medication management.      Last seen November 2024.  At that time, no medication changes.  June 2024. At that time, no medication changes.  April 2024. At that time, alprazolam was increased.  Last seen in person February 2024.  At that time, alprazolam was increased.  In interim, 3 days later, Abilify was increased.  October 2023. At that time, no medication changes.  May 2023.  At that time, no medication changes.  November 2022. At that time, no medication changes.  October 2022. At that time, alprazolam PRN was increased.   April 2022. At that time, no medication changes.  January 2022. At that time, no medication changes.  October 2021. At that time, no medication changes.   July 2021. At that time, alprazolam PRN was decreased.    April 2021.At that time, no medication changes.   January 2021.At that time, no medication changes.   October 2020. At that time, no medication changes.   July 2020. At that time, Abilify was decreased and PRN Alprazolam was increased.  November 2019. At that time, no med changes.  August 2019. At that time, no med changes.  April 2019. At that time, no med changes.  January 2019. At that time, Melatonin was started.   August 2018. At that time, no med changes.  July 2018. At that time, recommended moving majority of dose [place] to HS.   April 2018. At that time, no medicine changes.  February 2018: Alprazolam was discontinued due to complaints of dizziness  Jan 2018: BuSpar was discontinued and lithium was increased.  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 At that time, BuSpar was started.     Ava reports that she has been using her coping skills to remain calm.  She is looking forward to Thanksgiving where some of her family will be coming over.  She remains social and interactive during assessment.  Parents report that they have been utilizing a calendar to help her overcome her fear of walking.  They have utilized exposure therapy to help her overcome her fear of steps and bridges.  For example, she held onto her father's arm to go up and down stairs.  Need [place]  2/25/25 PHQ-9 score   Mom and Dad report with med increases, calmer, with less outbursts tolerates parents leaving.     Continues to go on walks with Dad.  Still worries about bridges, hills, and stairs. Previously increased anxiety, anticipatory anxiety, panic and agoraphobia.       Was seeing Ale monthly for therapy.      October 2022 DNC. discovered rectal prolapse. Saw colorectal sx in [facility] & colorectal hysterectomy- decided no Sx but IUD. No longer bleeding and mood has improved.  May have need sx for prolapsed rectum.  Sees Candis, through provider services, every Monday. Goes for walks.       PRN alprazolam and PRN Abilify:  Vacation at [place]  "Head- needed PRN most every day. Did not want to shower- screaming and parents afraid others will call police.  [place], in parking garage- refused to get out to eat at restaurant- screaming. Left without eating.  Still needs PRN when making plans with friends.  \"her anxiety is holding us hostage. we have had to change plans.\"  small ramps cause screaming.  Even when plans are made in advance, they will drive there ([place]) once there, she will start screaming.  \"Panic in her eyes\".   \"Off the chart\" panic - vacation= 3 x in a week.   - any time in a new environment. Parents go ahead of time and will video the route to walk in and bring it back to her. she mostly refuses to walk to the event.  - mostly stays in her room, cries, stays in her room, states she is sick. She may change her mind and then go.      She may cry, but uses deep breathing and able to calm herself. Plans are not made too far in advanced. Ava will obsess over it until the time comes. When her parents are leaving, she often holds then up by changing her mind about whether she is going or not.     eats dinner in her own apartment- which is in her parents home  grocery shops     Has a 7 yo Border Collie named Juan.  Sleep: Denies issues falling and staying asleep.   Utilizing charts and check off list for rules and behavior. Runs the vacuum, sets the table, laundry     HX: screaming, Somatic: With complaints of headache and stomachache, slamming the doors, going to her room and refusing to come out, reported aggression (hitting/pushing?) lasting 30 min, until parents must leave in order to get to work on time.      Ava identifies more with elderly than with peers.   MEDICATIONS: Ritalin- blinking tics developed.  Anafranil = made worse, Pamelor, then [place] since age 7. Dx manic/depressive.   Senior year of HS- Imipramine- caused HS bed wetting= then Abilify  BuSpar= increased anxiety     Psy/SI/HI/aggression. Denies A/V/TH. Ava " states that she is only sad when a friend dies.  Dx PDD and Bipolar age 5 yo   Med Physicians:  PCP: Dr. Webb/ [facility]. July 2018.   Dr. Webb office # [phone] = for lab & EKG results  Urologist: Feb 2018. May need cystogram. If so, will be performed in OR.  GYN:Dr. Culp. September 30, 2022 DNC- possible hysterectomy. F/U 11/1/22     Med SE wt estella since middle school  2003/2004 hyperthyroid lost 30#  2007 gall bladder removed      COMPLIANCE: good     EPS- lower leg shuffling (not noted as via zoom) and pill rolling (1) oral buccal movements with tongue jutting (1) mouth opening (1) noted. Ct states does not feel uncomfortable in her skin. Severity (1). Ct does not appear aware. With decrease in Abilify, she has had a decrease in hand tremor.  AIMS score 4 based on the above. 5/9/23 2/25/25 score      LABS REVIEWED:  Feb 2024:  Lithium 0.90 (0.60-1.20 mmol/L)  August 2022 in chart  May 2021: in chart under May 7 2021 sPsych Patient Information  Lithium (0.8)  August 2020: Lithium (0.8), lipid, CBC, CMP- reviewed.  July 2019: Lipid, BMP, & CBC reviewed.  Feb 2019: Lithium 0.9  others reviewed     Feb 2018:  TSH- WNL  Lithium-1.0- WNL  Vit D-40.6- WNL     EKG   May 2021: in chart under May 7 2021 sPsych Patient Information  66 bpm  QTc 396     August 2019:  65 bpm  QTc 416 ms   Mental Status Exam     Appearance: well-groomed.   Build: overweight.   Demeanor: average.   Eye Contact: average.   Motor Activity: average.   Speech: clear.   Language: Neurologic language is intact.   Fund of Knowledge: fair fund of knowledge.   Delusions: None Reported.   Self Harm: None Reported.   Aggressive: None Reported.   Mood: euthymic.   Affect: full.   Orientation: alert.   Manner: cooperative.   Thought process: concrete.   Thought association: Impairment in rational thinking.   Content of thought: As noted in HPI   Abstract/ Rational Thought: minimal impairment   Memory: grossly intact.   Behavior:  calm.   Intelligence Estimate: intellectual disability.   Insight: minimal impairment.   Judgement: minimal impairment.   Musculoskeletal: normal strength and tone.

## 2025-02-25 NOTE — TELEPHONE ENCOUNTER
Caller: pt's mom, Bessy - today's 10am appointment missed, rescheduled to next available 4.16.25 - just needs refill on 2 medications to get to next appointment    Medication:  Xanax 0.5 mgs  Lithium 300 mgs    Pharmacy:  [facility]

## 2025-02-28 ENCOUNTER — APPOINTMENT (OUTPATIENT)
Dept: BEHAVIORAL HEALTH | Facility: CLINIC | Age: 40
End: 2025-02-28
Payer: MEDICARE

## 2025-02-28 DIAGNOSIS — F70 MILD INTELLECTUAL DISABILITY: ICD-10-CM

## 2025-02-28 DIAGNOSIS — F31.10 BIPOLAR DISORDER, CURRENT EPISODE MANIC WITHOUT PSYCHOTIC FEATURES: ICD-10-CM

## 2025-02-28 DIAGNOSIS — F41.1 GAD (GENERALIZED ANXIETY DISORDER): ICD-10-CM

## 2025-02-28 PROCEDURE — 90834 PSYTX W PT 45 MINUTES: CPT | Performed by: COUNSELOR

## 2025-02-28 NOTE — PROGRESS NOTES
Total Time: 39 min  Diagnosis: MATTHEW, Bipolar disorder, current episode manic without psychotic features, Mild intellectual disability  Visit Type: epic  Reason for visit: managing her worry and mood    - notes things are good, only had one episode with her provider about getting out of the car  - talked about going to [place] with her nephew   - notes she is taking her meds regularly now (vs PRN) and its better (they did miss a doc apt and had to get refill)  - they had her ISP and added to it about going out with someone other than them; and if it doesn't go well, theyll talk about day hab      focused on:  - STOP think, first and second options (wear extra clothes/layers, walking stick)  - active listening  - coping skills; distraction techniques  - continuing with her 2025 resolutions (exercise go out with family)

## 2025-04-11 ENCOUNTER — APPOINTMENT (OUTPATIENT)
Dept: BEHAVIORAL HEALTH | Facility: CLINIC | Age: 40
End: 2025-04-11
Payer: MEDICARE

## 2025-04-11 DIAGNOSIS — F31.10 BIPOLAR DISORDER, CURRENT EPISODE MANIC WITHOUT PSYCHOTIC FEATURES: ICD-10-CM

## 2025-04-11 DIAGNOSIS — F41.1 GAD (GENERALIZED ANXIETY DISORDER): ICD-10-CM

## 2025-04-11 DIAGNOSIS — F70 MILD INTELLECTUAL DISABILITY: ICD-10-CM

## 2025-04-11 PROCEDURE — 90834 PSYTX W PT 45 MINUTES: CPT | Performed by: COUNSELOR

## 2025-04-11 NOTE — PROGRESS NOTES
Total Time: 41 min  Diagnosis: MATTHEW, Bipolar disorder, current episode manic without psychotic features, Mild intellectual disability  Visit Type: epic  Reason for visit: managing her worry and mood    - notes she went to [place] for a party, went to lunch with a friend (parents noticed some social deficits they didn't notice before)  - talked about easter, sister will come over, is getting her COVID shot later today  - talked about her upcoming bday; continues to use checklist, talked about her walking issues  - parents sent email update, overall things are really good, less blow ups, really loves the check list but the walking is really bad, baby steps      focused on:  - STOP think, first and second options (wear extra clothes/layers, walking stick)  - active listening  - coping skills; distraction techniques  - continuing with her 2025 resolutions (exercise go out with family)

## 2025-04-16 ENCOUNTER — APPOINTMENT (OUTPATIENT)
Dept: BEHAVIORAL HEALTH | Facility: CLINIC | Age: 40
End: 2025-04-16
Payer: MEDICARE

## 2025-04-16 DIAGNOSIS — F84.0 AUTISM (HHS-HCC): ICD-10-CM

## 2025-04-16 DIAGNOSIS — F70 MILD INTELLECTUAL DISABILITY: ICD-10-CM

## 2025-04-16 DIAGNOSIS — F31.10 BIPOLAR DISORDER, CURRENT EPISODE MANIC WITHOUT PSYCHOTIC FEATURES: Primary | ICD-10-CM

## 2025-04-16 DIAGNOSIS — F41.1 GAD (GENERALIZED ANXIETY DISORDER): ICD-10-CM

## 2025-04-16 DIAGNOSIS — Z79.899 ENCOUNTER FOR LONG-TERM (CURRENT) USE OF HIGH-RISK MEDICATION: ICD-10-CM

## 2025-04-16 PROCEDURE — 99214 OFFICE O/P EST MOD 30 MIN: CPT | Performed by: NURSE PRACTITIONER

## 2025-04-16 RX ORDER — LITHIUM CARBONATE 150 MG/1
150 CAPSULE ORAL NIGHTLY
Qty: 90 CAPSULE | Refills: 0 | Status: SHIPPED | OUTPATIENT
Start: 2025-04-16

## 2025-04-16 RX ORDER — ALPRAZOLAM 0.5 MG/1
0.5 TABLET ORAL 3 TIMES DAILY
Qty: 90 TABLET | Refills: 2 | Status: SHIPPED | OUTPATIENT
Start: 2025-04-16

## 2025-04-16 RX ORDER — LITHIUM CARBONATE 300 MG/1
TABLET, FILM COATED, EXTENDED RELEASE ORAL
Qty: 360 TABLET | Refills: 0 | Status: SHIPPED | OUTPATIENT
Start: 2025-04-16

## 2025-04-16 RX ORDER — ARIPIPRAZOLE 5 MG/1
TABLET ORAL
Qty: 90 TABLET | Refills: 0 | Status: SHIPPED | OUTPATIENT
Start: 2025-04-16

## 2025-04-16 NOTE — PROGRESS NOTES
"ASSESSMENT: Ms. Pinto presents at baseline mental health wise. Has not needed Abilify PRN dose in >6 months.  Parents report \"it is nice to have our Ava back\".  See treatment plan below.     Pharmacogenomics Testing (PGT) results reviewed:   Use as Directed:    Alprazolam (Xanax), Aripiprazole (Abilify)  No Proven Genetic Markers:   Lithium     PLAN:                                                                                                            problems treated   f/u requested to prevent relapse   medications renewed/re-ordered     1. Continue alprazolam (Xanax) 0.50 mg by mouth TID. I have personally reviewed the OARRS report 4/16/2025. I have considered the risks of abuse, dependence, addiction and diversion. Benzo agreement signed 2/25/25 in media  2. Continue Lithium 600 mg by mouth in the morning, 300 mg at 3 pm, 450 mg at bedtime for moods  3. Continue Aripiprazole (Abilify) take 5 mg by mouth daily, may take additional half tablet (2.5 mg) by mouth daily PRN for moods. Mom will contact when refill is needed.  4. Risks, benefits, alternatives, off-label uses, and side effects of medications have been discussed with patient/caregiver. There is no report of signs/symptoms consistent with medication-induced impairment in daily functioning. At this time, benefits of medication felt to outweigh potential risks. Will continue to reassess need for psychotropic medication at regular 3 to 6 month intervals.  5. Return To Clinic Weds July 16, 2025 at 1 PM virtual or earlier if needed. Call [phone] to reschedule.  6. Rx for fasting labs and EKG. No Lithium for at least 10 hours prior to lab draw. Mom will send labs completed at PCP office to avoid duplication.     Thank you for seeing me today.  If you have any questions or concerns, do not hesitate to contact my office.  Millie Ugarte     TREATMENT TYPE                                                                                               "    counseling and coordination of care addressing signs and symptoms of illness; risks/benefits and side effects of medications; and behavioral approaches to illness.  This note was created using electronic dictation. There may be errors in syntax and meaning. Please contact the office with any questions.   For [facility] residents, [place] is a 24/7 hotline you can call for assistance [[phone]].   Please call 911 or go to your closest Emergency Room if you feel worse. This includes thoughts of hurting yourself or anyone else, or having other troubles such as hearing voices, seeing visions, or having new and scary thoughts about the people around you.      Provider Impressions     PRESENT FOR APPOINTMENT  Client  Mother Bessy Pinto  Father: Mohamud Blair     An interactive audio and video telecommunication system which permits real time communications between the patient (at the originating site) and provider (at the distant site) was utilized to provide this telehealth service.    Verbal consent was requested and obtained from Ct on this date, for a telehealth visit.     SUBJECTIVE: 40 yo CF with a history of Autism, MATTHEW, ID, OCD and Bipolar DO presenting for medication management.      Last seen November 2024.  At that time, no medication changes.  June 2024. At that time, no medication changes.  April 2024. At that time, alprazolam was increased.  Last seen in person February 2024.  At that time, alprazolam was increased.  In interim, 3 days later, Abilify was increased.  October 2023. At that time, no medication changes.  May 2023.  At that time, no medication changes.  November 2022. At that time, no medication changes.  October 2022. At that time, alprazolam PRN was increased.   April 2022. At that time, no medication changes.  January 2022. At that time, no medication changes.  October 2021. At that time, no medication changes.   July 2021. At that time, alprazolam PRN was decreased.    April 2021.At that time, no medication changes.   January 2021.At that time, no medication changes.   October 2020. At that time, no medication changes.   July 2020. At that time, Abilify was decreased and PRN Alprazolam was increased.  November 2019. At that time, no med changes.  August 2019. At that time, no med changes.  April 2019. At that time, no med changes.  January 2019. At that time, Melatonin was started.   August 2018. At that time, no med changes.  July 2018. At that time, recommended moving majority of dose [place] to HS.   April 2018. At that time, no medicine changes.  February 2018: Alprazolam was discontinued due to complaints of dizziness  Jan 2018: BuSpar was discontinued and lithium was increased.  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 At that time, BuSpar was started.     Ava reports that she has been using her coping skills to remain calm.  She is looking forward to [facility] and her birthday.  Parents report stable.  They expressed concern over LT use of xanax. Med ed provided, labs, r vs b, and GDR discussed.    She remains social and interactive during assessment.  Parents report that they have been utilizing a calendar to help her overcome her fear of walking.  They have utilized exposure therapy to help her overcome her fear of steps and bridges.  For example, she held onto her father's arm to go up and down stairs.    Mom and Dad report with med increases, calmer, with less outbursts tolerates parents leaving.     Continues to go on walks with Dad.  Still worries about bridges, hills, and stairs. Previously increased anxiety, anticipatory anxiety, panic and agoraphobia.       Was seeing Ale monthly for therapy.      October 2022 DNC. discovered rectal prolapse. Saw colorectal sx in [facility] & colorectal hysterectomy- decided no Sx but IUD. No longer bleeding and mood has improved.  May have need sx for prolapsed rectum.  Sees Candis, through provider services, every Monday. Goes for walks.        "PRN alprazolam and PRN Abilify:  Vacation at [facility]- needed PRN most every day. Did not want to shower- screaming and parents afraid others will call police.  Vielka SCHULTZ, in parking garage- refused to get out to eat at restaurant- screaming. Left without eating.  Still needs PRN when making plans with friends.  \"her anxiety is holding us hostage. we have had to change plans.\"  small ramps cause screaming.  Even when plans are made in advance, they will drive there ([place]) once there, she will start screaming.  \"Panic in her eyes\".   \"Off the chart\" panic - vacation= 3 x in a week.   - any time in a new environment. Parents go ahead of time and will video the route to walk in and bring it back to her. she mostly refuses to walk to the event.  - mostly stays in her room, cries, stays in her room, states she is sick. She may change her mind and then go.      She may cry, but uses deep breathing and able to calm herself. Plans are not made too far in advanced. Ava will obsess over it until the time comes. When her parents are leaving, she often holds then up by changing her mind about whether she is going or not.     eats dinner in her own apartment- which is in her parents home  grocery shops     Has a 7 yo Border Collie named Juan.  Sleep: Denies issues falling and staying asleep.   Utilizing charts and check off list for rules and behavior. Runs the vacuum, sets the table, laundry     HX: screaming, Somatic: With complaints of headache and stomachache, slamming the doors, going to her room and refusing to come out, reported aggression (hitting/pushing?) lasting 30 min, until parents must leave in order to get to work on time.      Ava identifies more with elderly than with peers.   MEDICATIONS: Ritalin- blinking tics developed.  Anafranil = made worse, Pamelor, then [place] since age 7. Dx manic/depressive.   Senior year of HS- Imipramine- caused HS bed wetting= then Abilify  BuSpar= increased " anxiety     Psy/SI/HI/aggression. Denies A/V/TH. Ava states that she is only sad when a friend dies.  Dx PDD and Bipolar age 5 yo   Med Physicians:  PCP: Dr. Webb/ [facility]. July 2018.   Dr. Webb office # [phone] = for lab & EKG results  Urologist: Feb 2018. May need cystogram. If so, will be performed in OR.  GYN:Dr. Culp. September 30, 2022 DNC- possible hysterectomy. F/U 11/1/22     Med SE wt estella since middle school  2003/2004 hyperthyroid lost 30#  2007 gall bladder removed      COMPLIANCE: good     EPS- lower leg shuffling (not noted as via zoom) and pill rolling (1) oral buccal movements with tongue jutting (1) mouth opening (1) noted. Ct states does not feel uncomfortable in her skin. Severity (1). Ct does not appear aware. With decrease in Abilify, she has had a decrease in hand tremor.  AIMS score 4 based on the above. 5/9/23     LABS REVIEWED:  Feb 2024:  Lithium 0.90 (0.60-1.20 mmol/L)  August 2022 in chart  May 2021: in chart under May 7 2021 [facility]Psych Patient Information  Lithium (0.8)  August 2020: Lithium (0.8), lipid, CBC, CMP- reviewed.  July 2019: Lipid, BMP, & CBC reviewed.  Feb 2019: Lithium 0.9  others reviewed     Feb 2018:  TSH- WNL  Lithium-1.0- WNL  Vit D-40.6- WNL     EKG   May 2021: in chart under May 7 2021 [facility] Patient Information  66 bpm  QTc 396     August 2019:  65 bpm  QTc 416 ms   Mental Status Exam     Appearance: well-groomed.   Build: overweight.   Demeanor: average.   Eye Contact: average.   Motor Activity: average.   Speech: clear.   Language: Neurologic language is intact.   Fund of Knowledge: fair fund of knowledge.   Delusions: None Reported.   Self Harm: None Reported.   Aggressive: None Reported.   Mood: euthymic.   Affect: full.   Orientation: alert.   Manner: cooperative.   Thought process: concrete.   Thought association: Impairment in rational thinking.   Content of thought: As noted in HPI   Abstract/ Rational Thought: minimal impairment    Memory: grossly intact.   Behavior: calm.   Intelligence Estimate: intellectual disability.   Insight: minimal impairment.   Judgement: minimal impairment.   Musculoskeletal: normal strength and tone.       severe/Malnutrition

## 2025-05-01 ENCOUNTER — TELEPHONE (OUTPATIENT)
Dept: OTHER | Age: 40
End: 2025-05-01
Payer: MEDICARE

## 2025-05-01 DIAGNOSIS — Z79.899 ENCOUNTER FOR LONG-TERM (CURRENT) USE OF HIGH-RISK MEDICATION: ICD-10-CM

## 2025-05-01 NOTE — TELEPHONE ENCOUNTER
Caller: Lab representative, Latrice    Calling to see if ok to do urine screening instead of drug screen 9 blood order?    Please have staff advise, [phone]

## 2025-05-23 ENCOUNTER — APPOINTMENT (OUTPATIENT)
Dept: BEHAVIORAL HEALTH | Facility: CLINIC | Age: 40
End: 2025-05-23
Payer: MEDICARE

## 2025-05-23 DIAGNOSIS — F41.1 GAD (GENERALIZED ANXIETY DISORDER): ICD-10-CM

## 2025-05-23 DIAGNOSIS — F70 MILD INTELLECTUAL DISABILITY: ICD-10-CM

## 2025-05-23 DIAGNOSIS — F31.10 BIPOLAR DISORDER, CURRENT EPISODE MANIC WITHOUT PSYCHOTIC FEATURES: ICD-10-CM

## 2025-05-23 PROCEDURE — 90834 PSYTX W PT 45 MINUTES: CPT | Performed by: COUNSELOR

## 2025-05-23 NOTE — PROGRESS NOTES
"Total Time: 45 min  Diagnosis: MATTHEW, Bipolar disorder, current episode manic without psychotic features, Mild intellectual disability  Visit Type: epic  Reason for visit: managing her worry and mood      - notes things have been good; she had 3 bday parties; one with the mayor, one with her older women friends and one at home; notes she was calm and really happy  - talked a lot about going to [place]  - worked on conversations and she continued to interrupt and we tried to work on that  - parents update: overall shes been doing really well, getting out more, walking better, no major meltdowns      focused on:  - STOP think, first and second options   - active listening  - coping skills; distraction techniques  - work on a \"summer bucket/to do list\" (has been trying to adhere to 2 outings a week without mom/dad)    "

## 2025-06-20 ENCOUNTER — APPOINTMENT (OUTPATIENT)
Dept: BEHAVIORAL HEALTH | Facility: CLINIC | Age: 40
End: 2025-06-20
Payer: MEDICARE

## 2025-06-20 DIAGNOSIS — F41.1 GAD (GENERALIZED ANXIETY DISORDER): ICD-10-CM

## 2025-06-20 DIAGNOSIS — F31.10 BIPOLAR DISORDER, CURRENT EPISODE MANIC WITHOUT PSYCHOTIC FEATURES: ICD-10-CM

## 2025-06-20 DIAGNOSIS — F70 MILD INTELLECTUAL DISABILITY: ICD-10-CM

## 2025-06-20 PROCEDURE — 90834 PSYTX W PT 45 MINUTES: CPT | Performed by: COUNSELOR

## 2025-06-20 NOTE — PROGRESS NOTES
"Total Time: 42 min  Diagnosis: MATTHEW, Bipolar disorder, current episode manic without psychotic features, Mild intellectual disability  Visit Type: epic  Reason for visit: managing her worry and mood         - notes her trip to [place] was fun  - notes the memorial day parade was fun, she was invited to be in the 4th of july parade next month  - notes baseball started, talked about seeing her friends for lunch  - notes she has been going on walks with her family; when pressed she notes she is not doing that, she stays on the treadmill and its been \"too hot\"  - parents note: things continue to move along nicely, mood swings are still there but no screaming/yelling; going out with family/new places has gradually gotten better, still wont walk the dog. Mom is going through a lot of medical issues and she is handling it really well/ helping with dinner when mom is in pain       focused on:  - STOP think, first and second options   - active listening  - coping skills; distraction techniques  - work on a \"summer bucket/to do list\" (things she will actually agree to, try a new hiking trail)    "

## 2025-07-08 DIAGNOSIS — F31.10 BIPOLAR DISORDER, CURRENT EPISODE MANIC WITHOUT PSYCHOTIC FEATURES: ICD-10-CM

## 2025-07-09 RX ORDER — ARIPIPRAZOLE 5 MG/1
TABLET ORAL
Qty: 90 TABLET | Refills: 0 | Status: SHIPPED | OUTPATIENT
Start: 2025-07-09

## 2025-07-16 ENCOUNTER — APPOINTMENT (OUTPATIENT)
Dept: BEHAVIORAL HEALTH | Facility: CLINIC | Age: 40
End: 2025-07-16
Payer: COMMERCIAL

## 2025-07-16 DIAGNOSIS — Z79.899 ENCOUNTER FOR LONG-TERM (CURRENT) USE OF HIGH-RISK MEDICATION: ICD-10-CM

## 2025-07-16 DIAGNOSIS — F84.0 AUTISM (HHS-HCC): ICD-10-CM

## 2025-07-16 DIAGNOSIS — F31.10 BIPOLAR DISORDER, CURRENT EPISODE MANIC WITHOUT PSYCHOTIC FEATURES: Primary | ICD-10-CM

## 2025-07-16 DIAGNOSIS — F41.1 GAD (GENERALIZED ANXIETY DISORDER): ICD-10-CM

## 2025-07-16 DIAGNOSIS — F70 MILD INTELLECTUAL DISABILITY: ICD-10-CM

## 2025-07-16 PROCEDURE — 1036F TOBACCO NON-USER: CPT | Performed by: NURSE PRACTITIONER

## 2025-07-16 PROCEDURE — 99214 OFFICE O/P EST MOD 30 MIN: CPT | Performed by: NURSE PRACTITIONER

## 2025-07-16 RX ORDER — LITHIUM CARBONATE 150 MG/1
150 CAPSULE ORAL NIGHTLY
Qty: 90 CAPSULE | Refills: 0 | Status: SHIPPED | OUTPATIENT
Start: 2025-07-16

## 2025-07-16 RX ORDER — ALPRAZOLAM 0.5 MG/1
0.5 TABLET ORAL 3 TIMES DAILY
Qty: 90 TABLET | Refills: 2 | Status: SHIPPED | OUTPATIENT
Start: 2025-07-16

## 2025-07-16 RX ORDER — LITHIUM CARBONATE 300 MG/1
TABLET, FILM COATED, EXTENDED RELEASE ORAL
Qty: 360 TABLET | Refills: 0 | Status: SHIPPED | OUTPATIENT
Start: 2025-07-16

## 2025-07-16 ASSESSMENT — ABNORMAL INVOLUNTARY MOVEMENT SCALE (AIMS)
CURRENT_PROBLEMS_TEETH_DENTURES: NO
FACIAL_EXPRESSION_MUSCLES: NONE, NORMAL
TONGUE: MINIMAL
AIMS_SEVERITY: 1
NECK_SHOULDER_HIPS: NONE, NORMAL
AIMS_PATIENT_INCAPACITATION: NONE, NORMAL
UPPER_BODY_EXTREMITIES: NONE, NORMAL
LOWER_BODY_EXTREMITIES: MINIMAL
JAW: NONE, NORMAL
LIPS_PARIETAL: MINIMAL
PATIENT_WEARS_DENTURES: NO
AIMS_PATIENT_AWARENESS: NO AWARENESS

## 2025-07-16 ASSESSMENT — PATIENT HEALTH QUESTIONNAIRE - PHQ9
1. LITTLE INTEREST OR PLEASURE IN DOING THINGS: NOT AT ALL
7. TROUBLE CONCENTRATING ON THINGS, SUCH AS READING THE NEWSPAPER OR WATCHING TELEVISION: NOT AT ALL
5. POOR APPETITE OR OVEREATING: NOT AT ALL
6. FEELING BAD ABOUT YOURSELF - OR THAT YOU ARE A FAILURE OR HAVE LET YOURSELF OR YOUR FAMILY DOWN: NOT AT ALL
4. FEELING TIRED OR HAVING LITTLE ENERGY: NOT AT ALL
3. TROUBLE FALLING OR STAYING ASLEEP OR SLEEPING TOO MUCH: NOT AT ALL
2. FEELING DOWN, DEPRESSED OR HOPELESS: NOT AT ALL
8. MOVING OR SPEAKING SO SLOWLY THAT OTHER PEOPLE COULD HAVE NOTICED. OR THE OPPOSITE, BEING SO FIGETY OR RESTLESS THAT YOU HAVE BEEN MOVING AROUND A LOT MORE THAN USUAL: NOT AT ALL
9. THOUGHTS THAT YOU WOULD BE BETTER OFF DEAD, OR OF HURTING YOURSELF: NOT AT ALL

## 2025-07-16 NOTE — PATIENT INSTRUCTIONS
1. Continue alprazolam (Xanax) 0.50 mg by mouth TID. I have personally reviewed the OARRS report 7/16/2025. I have considered the risks of abuse, dependence, addiction and diversion. Benzo agreement signed 2/25/25 in media  2. Continue Lithium 600 mg by mouth in the morning, 300 mg at 3 pm, 450 mg at bedtime for moods  3. Continue Aripiprazole (Abilify) take 5 mg by mouth daily, may take additional half tablet (2.5 mg) by mouth daily PRN for moods. Mom will contact when refill is needed.  4. Risks, benefits, alternatives, off-label uses, and side effects of medications have been discussed with patient/caregiver. There is no report of signs/symptoms consistent with medication-induced impairment in daily functioning. At this time, benefits of medication felt to outweigh potential risks. Will continue to reassess need for psychotropic medication at regular 3 to 6 month intervals.  5. Return To Clinic Thursday Nov 13, 2025 at 10 AM virtual or earlier if needed. Call [phone] to reschedule.  6. Please fax results of serum lithium and EKG to [phone].     Thank you for seeing me today.  If you have any questions or concerns, do not hesitate to contact my office.  Millie Ugarte     TREATMENT TYPE                                                                                                  counseling and coordination of care addressing signs and symptoms of illness; risks/benefits and side effects of medications; and behavioral approaches to illness.  This note was created using electronic dictation. There may be errors in syntax and meaning. Please contact the office with any questions.

## 2025-07-16 NOTE — PROGRESS NOTES
ASSESSMENT: Ms. Pinto presents at baseline [facility] health wise. Has not needed Abilify PRN dose in >6 months.  See treatment plan below.     Pharmacogenomics Testing (PGT) results reviewed:   Use as Directed:    Alprazolam (Xanax), Aripiprazole (Abilify)  No Proven Genetic Markers:   Lithium     PLAN:                                                                                                            problems treated   f/u requested to prevent relapse   medications renewed/re-ordered  Labs reviewed  1. Continue alprazolam (Xanax) 0.50 mg by mouth TID. I have personally reviewed the OARRS report 7/16/2025. I have considered the risks of abuse, dependence, addiction and diversion. Benzo agreement signed 2/25/25 in media.  Drug screen completed in May 2025.  2. Continue Lithium 600 mg by mouth in the morning, 300 mg at 3 pm, 450 mg at bedtime for moods  3. Continue Aripiprazole (Abilify) take 5 mg by mouth daily, may take additional half tablet (2.5 mg) by mouth daily PRN for moods. Mom will contact when refill is needed.  4. Risks, benefits, alternatives, off-label uses, and side effects of medications have been discussed with patient/caregiver. There is no report of signs/symptoms consistent with medication-induced impairment in daily functioning. At this time, benefits of medication felt to outweigh potential risks. Will continue to reassess need for psychotropic medication at regular 3 to 6 month intervals.  5. Return To Clinic Thursday Nov 13, 2025 at 10 AM virtual or earlier if needed. Call [phone] to reschedule.  6. Please fax results of serum lithium and EKG to [phone].     Thank you for seeing me today.  If you have any questions or concerns, do not hesitate to contact my office.  Millie Ugarte     TREATMENT TYPE                                                                                                  counseling and coordination of care addressing signs and symptoms of illness;  risks/benefits and side effects of medications; and behavioral approaches to illness.  This note was created using electronic dictation. There may be errors in syntax and meaning. Please contact the office with any questions.   For [facility] residents, [facility] [place] is a 24/7 hotline you can call for assistance [[phone]].   Please call 911 or go to your closest Emergency Room if you feel worse. This includes thoughts of hurting yourself or anyone else, or having other troubles such as hearing voices, seeing visions, or having new and scary thoughts about the people around you.      Provider Impressions     PRESENT FOR APPOINTMENT  Client  Mother Bessy Pinto  Father: Mohamud Pinto     An interactive audio and video telecommunication system which permits real time communications between the patient (at the originating site) and provider (at the distant site) was utilized to provide this telehealth service.    Verbal consent was requested and obtained from Ct on this date, for a telehealth visit.     SUBJECTIVE: 39 yo CF with a history of Autism, MATTHEW, ID, OCD and Bipolar DO presenting for medication management.      Last seen April 2025. No med changes.  November 2024.  At that time, no medication changes.  June 2024. At that time, no medication changes.  April 2024. At that time, alprazolam was increased.  Last seen in person February 2024.  At that time, alprazolam was increased.  In interim, 3 days later, Abilify was increased.  October 2023. At that time, no medication changes.  May 2023.  At that time, no medication changes.  November 2022. At that time, no medication changes.  October 2022. At that time, alprazolam PRN was increased.   April 2022. At that time, no medication changes.  January 2022. At that time, no medication changes.  October 2021. At that time, no medication changes.   July 2021. At that time, alprazolam PRN was decreased.   April 2021.At that time, no medication changes.    January 2021.At that time, no medication changes.   October 2020. At that time, no medication changes.   July 2020. At that time, Abilify was decreased and PRN Alprazolam was increased.  November 2019. At that time, no med changes.  August 2019. At that time, no med changes.  April 2019. At that time, no med changes.  January 2019. At that time, Melatonin was started.   August 2018. At that time, no med changes.  July 2018. At that time, recommended moving majority of dose [place] to HS.   April 2018. At that time, no medicine changes.  February 2018: Alprazolam was discontinued due to complaints of dizziness  Jan 2018: BuSpar was discontinued and lithium was increased.  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 At that time, BuSpar was started.     Ava reports that she has been using her coping skills to remain calm.  Parents report stable.    She remains social and interactive during assessment.  Parents report that they have been utilizing a calendar to help her overcome her fear of walking.  They have utilized exposure therapy to help her overcome her fear of steps and bridges.  For example, she held onto her father's arm to go up and down stairs.  7/16/2025 PHQ-9 score negative  Mom and Dad report with med increases, calmer, with less outbursts tolerates parents leaving.     Continues to go on walks with Dad.  Still worries about bridges, hills, and stairs. Previously increased anxiety, anticipatory anxiety, panic and agoraphobia.       seeing Ale monthly for therapy.  Uses chart behaviors     October 2022 DNC. discovered rectal prolapse. Saw colorectal sx in [facility] & colorectal hysterectomy- decided no Sx but IUD. No longer bleeding and mood has improved.  May have need sx for prolapsed rectum.  Sees Candis, through provider services, every Monday. Goes for walks.       PRN alprazolam and PRN Abilify:  Vacation at [facility]- needed PRN most every day. Did not want to shower- screaming and parents afraid others will  "call police.  Vielka SCHULTZ, in parking garage- refused to get out to eat at restaurant- screaming. Left without eating.  Still needs PRN when making plans with friends.  \"her anxiety is holding us hostage. we have had to change plans.\"  small ramps cause screaming.  Even when plans are made in advance, they will drive there (IE [place]) once there, she will start screaming.  \"Panic in her eyes\".   \"Off the chart\" panic - vacation= 3 x in a week.   - any time in a new environment. Parents go ahead of time and will video the route to walk in and bring it back to her. she mostly refuses to walk to the event.  - mostly stays in her room, cries, stays in her room, states she is sick. She may change her mind and then go.      She may cry, but uses deep breathing and able to calm herself. Plans are not made too far in advanced. Ava will obsess over it until the time comes. When her parents are leaving, she often holds then up by changing her mind about whether she is going or not.     eats dinner in her own apartment- which is in her parents home  grocery shops     Has a 7 yo Border Collie named uJan.  Sleep: Denies issues falling and staying asleep.   Utilizing charts and check off list for rules and behavior. Runs the vacuum, sets the table, laundry     HX: screaming, Somatic: With complaints of headache and stomachache, slamming the doors, going to her room and refusing to come out, reported aggression (hitting/pushing?) lasting 30 min, until parents must leave in order to get to work on time.      Ava identifies more with elderly than with peers.   MEDICATIONS: Ritalin- blinking tics developed.  Anafranil = made worse, Pamelor, then Saline since age 7. Dx manic/depressive.   Senior year of HS- Imipramine- caused HS bed wetting= then Abilify  BuSpar= increased anxiety     Psy/SI/HI/aggression. Denies A/V/TH. Ava states that she is only sad when a friend dies.  Dx PDD and Bipolar age 5 yo   Med Physicians:  PCP: " Dr. Webb/ [facility]. July 2018.   Dr. Webb office # [phone] = for lab & EKG results  Urologist: Feb 2018. May need cystogram. If so, will be performed in OR.  GYN:Dr. Culp. September 30, 2022 DNC- possible hysterectomy. F/U 11/1/22     Med SE wt estella since middle school  2003/2004 hyperthyroid lost 30#  2007 gall bladder removed      COMPLIANCE: good     EPS- lower leg shuffling (not noted as via zoom) and pill rolling (1) oral buccal movements with tongue jutting (1) mouth opening (1) noted. Ct states does not feel uncomfortable in her skin. Severity (1). Ct does not appear aware. With decrease in Abilify, she has had a decrease in hand tremor.  AIMS score 4 based on the above. 5/9/23.  And 7/16/2025      LABS REVIEWED:  May 20  at  Drug screen  CBC/differential  CMP    Feb 2024:  Lithium 0.90 (0.60-1.20 mmol/L)  August 2022 in chart  May 2021: in chart under May 7 2021 [facility] Patient Information  Lithium (0.8)  August 2020: Lithium (0.8), lipid, CBC, CMP- reviewed.  July 2019: Lipid, BMP, & CBC reviewed.  Feb 2019: Lithium 0.9  others reviewed     Feb 2018:  TSH- WNL  Lithium-1.0- WNL  Vit D-40.6- WNL     EKG  May 2025  May 2021: in chart under May 7 2021 [facility] Patient Information  66 bpm  QTc 396     August 2019:  65 bpm  QTc 416 ms   Mental Status Exam     Appearance: well-groomed.   Build: overweight.   Demeanor: average.   Eye Contact: average.   Motor Activity: average.   Speech: clear.   Language: Neurologic language is intact.   Fund of Knowledge: fair fund of knowledge.   Delusions: None Reported.   Self Harm: None Reported.   Aggressive: None Reported.   Mood: euthymic.   Affect: full.   Orientation: alert.   Manner: cooperative.   Thought process: concrete.   Thought association: Impairment in rational thinking.   Content of thought: As noted in HPI   Abstract/ Rational Thought: minimal impairment   Memory: grossly intact.   Behavior: calm.   Intelligence Estimate:  intellectual disability.   Insight: minimal impairment.   Judgement: minimal impairment.   Musculoskeletal: normal strength and tone.

## 2025-08-08 ENCOUNTER — APPOINTMENT (OUTPATIENT)
Dept: BEHAVIORAL HEALTH | Facility: CLINIC | Age: 40
End: 2025-08-08
Payer: MEDICARE

## 2025-08-08 DIAGNOSIS — F41.1 GAD (GENERALIZED ANXIETY DISORDER): ICD-10-CM

## 2025-08-08 DIAGNOSIS — F31.10 BIPOLAR DISORDER, CURRENT EPISODE MANIC WITHOUT PSYCHOTIC FEATURES: ICD-10-CM

## 2025-08-08 DIAGNOSIS — F70 MILD INTELLECTUAL DISABILITY: ICD-10-CM

## 2025-08-08 PROCEDURE — 90834 PSYTX W PT 45 MINUTES: CPT | Performed by: COUNSELOR

## 2025-08-08 NOTE — PROGRESS NOTES
Total Time: 49 min  Diagnosis: MATTHEW, Bipolar disorder, current episode manic without psychotic features, Mild intellectual disability   Visit Type: epic  Reason for visit: managing her worry and mood      - talked about [place], again. Talked about the [facility]  - talked about the parade, notes she got upset, it was too loud, hot; talked about being proactive, talking about the change, not wanting to go, being able to talk to her parents vs a meltdown  - talked about dads recent bday; notes she wasnt upset, stayed calm, visited, ate dinner  - talked about the rec, doing stairs alone (talked about the last time she fell, she notes a long time ago, reinforced that she has nothing to worry about)  - talked about her friend and going out to eat; talked about the new places shes been going [place]    - talked about a baseball game she is not going to bc of stairs  - parents report other than the one meltdown, she has been great, actually offering to go in places, try new things. Only real issue is her over eating/sneaking food while they are gone; possible flight and how to prepare, mom will update as it evolves      focused on:  - STOP think, first and second options   - active listening  - coping skills; distraction techniques  - continue with her checklist, summer/new activities   - talked about finding stairs she doesnt like and dont do the whole staircase, do like 3-4 steps and build on it

## 2025-09-12 ENCOUNTER — APPOINTMENT (OUTPATIENT)
Dept: BEHAVIORAL HEALTH | Facility: CLINIC | Age: 40
End: 2025-09-12
Payer: MEDICARE

## 2025-11-13 ENCOUNTER — APPOINTMENT (OUTPATIENT)
Dept: BEHAVIORAL HEALTH | Facility: CLINIC | Age: 40
End: 2025-11-13
Payer: MEDICARE